# Patient Record
Sex: FEMALE | Race: WHITE | NOT HISPANIC OR LATINO | Employment: UNEMPLOYED | URBAN - METROPOLITAN AREA
[De-identification: names, ages, dates, MRNs, and addresses within clinical notes are randomized per-mention and may not be internally consistent; named-entity substitution may affect disease eponyms.]

---

## 2021-12-08 ENCOUNTER — HOSPITAL ENCOUNTER (EMERGENCY)
Facility: CLINIC | Age: 56
Discharge: ANOTHER HOSPITAL | End: 2021-12-09
Attending: EMERGENCY MEDICINE | Admitting: EMERGENCY MEDICINE
Payer: MEDICAID

## 2021-12-08 ENCOUNTER — TELEPHONE (OUTPATIENT)
Dept: BEHAVIORAL HEALTH | Facility: CLINIC | Age: 56
End: 2021-12-08
Payer: MEDICAID

## 2021-12-08 DIAGNOSIS — F23 ACUTE PSYCHOSIS (H): ICD-10-CM

## 2021-12-08 DIAGNOSIS — L03.032 CELLULITIS OF LEFT TOE: ICD-10-CM

## 2021-12-08 LAB
ALCOHOL BREATH TEST: 0 (ref 0–0.01)
AMPHETAMINES UR QL SCN: NORMAL
BARBITURATES UR QL: NORMAL
BENZODIAZ UR QL: NORMAL
CANNABINOIDS UR QL SCN: NORMAL
COCAINE UR QL: NORMAL
OPIATES UR QL SCN: NORMAL

## 2021-12-08 PROCEDURE — C9803 HOPD COVID-19 SPEC COLLECT: HCPCS | Performed by: EMERGENCY MEDICINE

## 2021-12-08 PROCEDURE — 90791 PSYCH DIAGNOSTIC EVALUATION: CPT

## 2021-12-08 PROCEDURE — 250N000013 HC RX MED GY IP 250 OP 250 PS 637: Performed by: EMERGENCY MEDICINE

## 2021-12-08 PROCEDURE — 82075 ASSAY OF BREATH ETHANOL: CPT | Performed by: EMERGENCY MEDICINE

## 2021-12-08 PROCEDURE — 87635 SARS-COV-2 COVID-19 AMP PRB: CPT | Performed by: EMERGENCY MEDICINE

## 2021-12-08 PROCEDURE — 99285 EMERGENCY DEPT VISIT HI MDM: CPT | Performed by: EMERGENCY MEDICINE

## 2021-12-08 PROCEDURE — 99285 EMERGENCY DEPT VISIT HI MDM: CPT | Mod: 25 | Performed by: EMERGENCY MEDICINE

## 2021-12-08 PROCEDURE — 80307 DRUG TEST PRSMV CHEM ANLYZR: CPT | Performed by: EMERGENCY MEDICINE

## 2021-12-08 RX ORDER — CEPHALEXIN 500 MG/1
500 CAPSULE ORAL EVERY 6 HOURS SCHEDULED
Status: COMPLETED | OUTPATIENT
Start: 2021-12-08 | End: 2021-12-08

## 2021-12-08 RX ADMIN — CEPHALEXIN 500 MG: 500 CAPSULE ORAL at 22:08

## 2021-12-08 ASSESSMENT — ENCOUNTER SYMPTOMS
ACTIVITY CHANGE: 1
CARDIOVASCULAR NEGATIVE: 1
RESPIRATORY NEGATIVE: 1
DECREASED CONCENTRATION: 1
SLEEP DISTURBANCE: 1
COLOR CHANGE: 1
APPETITE CHANGE: 1
DYSPHORIC MOOD: 1
WOUND: 1
CONFUSION: 1
NERVOUS/ANXIOUS: 1
GASTROINTESTINAL NEGATIVE: 1
AGITATION: 1

## 2021-12-09 ENCOUNTER — TELEPHONE (OUTPATIENT)
Dept: BEHAVIORAL HEALTH | Facility: CLINIC | Age: 56
End: 2021-12-09
Payer: MEDICAID

## 2021-12-09 ENCOUNTER — HOSPITAL ENCOUNTER (INPATIENT)
Facility: CLINIC | Age: 56
LOS: 20 days | Discharge: PSYCHIATRIC OR SUBSTANCE USE FACILITY | End: 2021-12-29
Attending: PSYCHIATRY & NEUROLOGY | Admitting: PSYCHIATRY & NEUROLOGY
Payer: MEDICAID

## 2021-12-09 VITALS
OXYGEN SATURATION: 97 % | RESPIRATION RATE: 16 BRPM | HEART RATE: 77 BPM | SYSTOLIC BLOOD PRESSURE: 131 MMHG | TEMPERATURE: 97.7 F | WEIGHT: 127.8 LBS | DIASTOLIC BLOOD PRESSURE: 92 MMHG

## 2021-12-09 PROBLEM — F29 PSYCHOSIS (H): Status: ACTIVE | Noted: 2021-12-09

## 2021-12-09 LAB — SARS-COV-2 RNA RESP QL NAA+PROBE: NEGATIVE

## 2021-12-09 PROCEDURE — 128N000001 HC R&B CD/MH ADULT

## 2021-12-09 PROCEDURE — 250N000013 HC RX MED GY IP 250 OP 250 PS 637: Performed by: FAMILY MEDICINE

## 2021-12-09 RX ORDER — OLANZAPINE 10 MG/2ML
10 INJECTION, POWDER, FOR SOLUTION INTRAMUSCULAR 3 TIMES DAILY PRN
Status: DISCONTINUED | OUTPATIENT
Start: 2021-12-09 | End: 2021-12-29 | Stop reason: HOSPADM

## 2021-12-09 RX ORDER — ACETAMINOPHEN 325 MG/1
650 TABLET ORAL EVERY 4 HOURS PRN
Status: DISCONTINUED | OUTPATIENT
Start: 2021-12-09 | End: 2021-12-29 | Stop reason: HOSPADM

## 2021-12-09 RX ORDER — CEPHALEXIN 500 MG/1
500 CAPSULE ORAL EVERY 6 HOURS SCHEDULED
Status: COMPLETED | OUTPATIENT
Start: 2021-12-09 | End: 2021-12-09

## 2021-12-09 RX ORDER — OLANZAPINE 10 MG/1
10 TABLET ORAL 3 TIMES DAILY PRN
Status: DISCONTINUED | OUTPATIENT
Start: 2021-12-09 | End: 2021-12-29 | Stop reason: HOSPADM

## 2021-12-09 RX ORDER — MULTIVITAMIN,THERAPEUTIC
1 TABLET ORAL DAILY
Status: ON HOLD | COMMUNITY
End: 2021-12-30

## 2021-12-09 RX ORDER — MAGNESIUM HYDROXIDE/ALUMINUM HYDROXICE/SIMETHICONE 120; 1200; 1200 MG/30ML; MG/30ML; MG/30ML
30 SUSPENSION ORAL EVERY 4 HOURS PRN
Status: DISCONTINUED | OUTPATIENT
Start: 2021-12-09 | End: 2021-12-29 | Stop reason: HOSPADM

## 2021-12-09 RX ORDER — TRAZODONE HYDROCHLORIDE 50 MG/1
50 TABLET, FILM COATED ORAL
Status: DISCONTINUED | OUTPATIENT
Start: 2021-12-09 | End: 2021-12-29 | Stop reason: HOSPADM

## 2021-12-09 RX ORDER — HYDROXYZINE HYDROCHLORIDE 25 MG/1
25 TABLET, FILM COATED ORAL EVERY 4 HOURS PRN
Status: DISCONTINUED | OUTPATIENT
Start: 2021-12-09 | End: 2021-12-29 | Stop reason: HOSPADM

## 2021-12-09 RX ADMIN — CEPHALEXIN 500 MG: 500 CAPSULE ORAL at 10:40

## 2021-12-09 RX ADMIN — CEPHALEXIN 500 MG: 500 CAPSULE ORAL at 15:55

## 2021-12-09 ASSESSMENT — ACTIVITIES OF DAILY LIVING (ADL)
DRESS: SCRUBS (BEHAVIORAL HEALTH);INDEPENDENT
HYGIENE/GROOMING: HANDWASHING;INDEPENDENT
ORAL_HYGIENE: INDEPENDENT

## 2021-12-09 ASSESSMENT — MIFFLIN-ST. JEOR: SCORE: 1163.78

## 2021-12-09 NOTE — ED NOTES
[facility] ED Mental Health Handoff Note:       Brief HPI:  Patient was signed out to me by previous physician see initial ED Provider note for full details of the presentation.   Home meds reviewed and ordered/administered: Yes    Medically stable for inpatient mental health admission: Yes.    Evaluated by mental health: Yes. The recommendation is for inpatient mental health treatment. Bed search in process    Safety concerns: At the time I received sign out, there were no safety concerns.    Emergency department course:  Patient was signed out to me by previous physician.  Currently awaiting transfer or admission for mental health.  Patient was sleeping comfortably overnight during my shift.  There were no issues during my shift.  Patient care will be signed out to the oncoming physician.       Shelton Collins,   12/09/21 0134

## 2021-12-09 NOTE — ED TRIAGE NOTES
"Patient is here with sister and brother In law.   Patients families has concerned of mental health; for a couple months. Patient reports that she is \" in reality\" Patient reports that she is engaged to Quique He  And he is [place] the [place].     Sister Is very triggering for patient and confrontational in triage   "
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
## 2021-12-09 NOTE — PHARMACY-ADMISSION MEDICATION HISTORY
Admission Medication History Completed by Pharmacy    See [facility] Admission Navigator for allergy information, preferred outpatient pharmacy, prior to admission medications and immunization status.     Medication History Sources:     Patient    Chart Review    Changes made to PTA medication list (reason):    Added: None    Deleted: None    Changed: None    Additional Information:    Patient reported taking citalopram in the past, but it has been over a year since she last took it.    Prior to Admission medications    Medication Sig Last Dose Taking? Auth Provider   multivitamin, therapeutic (THERA-VIT) TABS tablet Take 1 tablet by mouth daily More than a month at Unknown time Yes Unknown, Entered By History     Date completed: 12/09/21    Medication history completed by: Nakia Gamble, YeimiD, BCPS
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
## 2021-12-09 NOTE — ED PROVIDER NOTES
Patient seen by Dr. Solares with history of manic behavior and toe pain.  Patient on keflex for toe cellulitis.     Patient otherwise been cooperative stable here in the ER been managed per protocol for mental health until patient now disposition planning to be transferred as a direct admission to [facility].    Accepting physician noted Dr. Bocanegra.    /78   Pulse 91   Temp 99.6  F (37.6  C) (Oral)   Resp 18   Wt 58 kg (127 lb 12.8 oz)   SpO2 97%   Breastfeeding No        Al Perez MD  12/09/21 9169

## 2021-12-10 LAB
ALBUMIN SERPL-MCNC: 3.8 G/DL (ref 3.5–5)
ALBUMIN UR-MCNC: NEGATIVE MG/DL
ALP SERPL-CCNC: 75 U/L (ref 45–120)
ALT SERPL W P-5'-P-CCNC: 11 U/L (ref 0–45)
ANION GAP SERPL CALCULATED.3IONS-SCNC: 10 MMOL/L (ref 5–18)
APPEARANCE UR: CLEAR
AST SERPL W P-5'-P-CCNC: 16 U/L (ref 0–40)
ATRIAL RATE - MUSE: 69 BPM
BACTERIA #/AREA URNS HPF: ABNORMAL /HPF
BASOPHILS # BLD AUTO: 0.1 10E3/UL (ref 0–0.2)
BASOPHILS NFR BLD AUTO: 1 %
BILIRUB SERPL-MCNC: 1.6 MG/DL (ref 0–1)
BILIRUB UR QL STRIP: NEGATIVE
BUN SERPL-MCNC: 19 MG/DL (ref 8–22)
CALCIUM SERPL-MCNC: 9.3 MG/DL (ref 8.5–10.5)
CHLORIDE BLD-SCNC: 105 MMOL/L (ref 98–107)
CHOLEST SERPL-MCNC: 180 MG/DL
CO2 SERPL-SCNC: 26 MMOL/L (ref 22–31)
COLOR UR AUTO: ABNORMAL
CREAT SERPL-MCNC: 0.77 MG/DL (ref 0.6–1.1)
DIASTOLIC BLOOD PRESSURE - MUSE: NORMAL MMHG
EOSINOPHIL # BLD AUTO: 0.2 10E3/UL (ref 0–0.7)
EOSINOPHIL NFR BLD AUTO: 2 %
ERYTHROCYTE [DISTWIDTH] IN BLOOD BY AUTOMATED COUNT: 12.1 % (ref 10–15)
GFR SERPL CREATININE-BSD FRML MDRD: 87 ML/MIN/1.73M2
GLUCOSE BLD-MCNC: 103 MG/DL (ref 70–125)
GLUCOSE UR STRIP-MCNC: NEGATIVE MG/DL
HBA1C MFR BLD: 5.2 %
HCT VFR BLD AUTO: 44.6 % (ref 35–47)
HDLC SERPL-MCNC: 58 MG/DL
HGB BLD-MCNC: 14.7 G/DL (ref 11.7–15.7)
HGB UR QL STRIP: ABNORMAL
IMM GRANULOCYTES # BLD: 0 10E3/UL
IMM GRANULOCYTES NFR BLD: 0 %
INTERPRETATION ECG - MUSE: NORMAL
KETONES UR STRIP-MCNC: NEGATIVE MG/DL
LDLC SERPL CALC-MCNC: 95 MG/DL
LEUKOCYTE ESTERASE UR QL STRIP: ABNORMAL
LYMPHOCYTES # BLD AUTO: 1.5 10E3/UL (ref 0.8–5.3)
LYMPHOCYTES NFR BLD AUTO: 22 %
MCH RBC QN AUTO: 31.2 PG (ref 26.5–33)
MCHC RBC AUTO-ENTMCNC: 33 G/DL (ref 31.5–36.5)
MCV RBC AUTO: 95 FL (ref 78–100)
MONOCYTES # BLD AUTO: 0.4 10E3/UL (ref 0–1.3)
MONOCYTES NFR BLD AUTO: 6 %
MUCOUS THREADS #/AREA URNS LPF: PRESENT /LPF
NEUTROPHILS # BLD AUTO: 4.6 10E3/UL (ref 1.6–8.3)
NEUTROPHILS NFR BLD AUTO: 69 %
NITRATE UR QL: NEGATIVE
NRBC # BLD AUTO: 0 10E3/UL
NRBC BLD AUTO-RTO: 0 /100
P AXIS - MUSE: 55 DEGREES
PH UR STRIP: 5.5 [PH] (ref 5–7)
PLATELET # BLD AUTO: 347 10E3/UL (ref 150–450)
POTASSIUM BLD-SCNC: 4.4 MMOL/L (ref 3.5–5)
PR INTERVAL - MUSE: 156 MS
PROT SERPL-MCNC: 7 G/DL (ref 6–8)
QRS DURATION - MUSE: 82 MS
QT - MUSE: 390 MS
QTC - MUSE: 417 MS
R AXIS - MUSE: 61 DEGREES
RBC # BLD AUTO: 4.71 10E6/UL (ref 3.8–5.2)
RBC URINE: 7 /HPF
SODIUM SERPL-SCNC: 141 MMOL/L (ref 136–145)
SP GR UR STRIP: 1.02 (ref 1–1.03)
SQUAMOUS EPITHELIAL: 3 /HPF
SYSTOLIC BLOOD PRESSURE - MUSE: NORMAL MMHG
T AXIS - MUSE: 75 DEGREES
TRANSITIONAL EPI: 1 /HPF
TRIGL SERPL-MCNC: 137 MG/DL
TSH SERPL DL<=0.005 MIU/L-ACNC: 1.34 UIU/ML (ref 0.3–5)
UROBILINOGEN UR STRIP-MCNC: <2 MG/DL
VENTRICULAR RATE- MUSE: 69 BPM
WBC # BLD AUTO: 6.6 10E3/UL (ref 4–11)
WBC URINE: 62 /HPF

## 2021-12-10 PROCEDURE — 36415 COLL VENOUS BLD VENIPUNCTURE: CPT | Performed by: PSYCHIATRY & NEUROLOGY

## 2021-12-10 PROCEDURE — 80053 COMPREHEN METABOLIC PANEL: CPT | Performed by: PSYCHIATRY & NEUROLOGY

## 2021-12-10 PROCEDURE — 999N000054 HC STATISTIC EKG NON-CHARGEABLE

## 2021-12-10 PROCEDURE — 87086 URINE CULTURE/COLONY COUNT: CPT | Performed by: PSYCHIATRY & NEUROLOGY

## 2021-12-10 PROCEDURE — 85025 COMPLETE CBC W/AUTO DIFF WBC: CPT | Performed by: PSYCHIATRY & NEUROLOGY

## 2021-12-10 PROCEDURE — 99223 1ST HOSP IP/OBS HIGH 75: CPT | Performed by: PSYCHIATRY & NEUROLOGY

## 2021-12-10 PROCEDURE — 93010 ELECTROCARDIOGRAM REPORT: CPT | Performed by: INTERNAL MEDICINE

## 2021-12-10 PROCEDURE — 128N000001 HC R&B CD/MH ADULT

## 2021-12-10 PROCEDURE — 93005 ELECTROCARDIOGRAM TRACING: CPT

## 2021-12-10 PROCEDURE — 83036 HEMOGLOBIN GLYCOSYLATED A1C: CPT | Performed by: PSYCHIATRY & NEUROLOGY

## 2021-12-10 PROCEDURE — 250N000013 HC RX MED GY IP 250 OP 250 PS 637: Performed by: PSYCHIATRY & NEUROLOGY

## 2021-12-10 PROCEDURE — 80061 LIPID PANEL: CPT | Performed by: PSYCHIATRY & NEUROLOGY

## 2021-12-10 PROCEDURE — 999N000157 HC STATISTIC RCP TIME EA 10 MIN

## 2021-12-10 PROCEDURE — 84443 ASSAY THYROID STIM HORMONE: CPT | Performed by: PSYCHIATRY & NEUROLOGY

## 2021-12-10 PROCEDURE — 81001 URINALYSIS AUTO W/SCOPE: CPT | Performed by: PSYCHIATRY & NEUROLOGY

## 2021-12-10 RX ORDER — ZIPRASIDONE HYDROCHLORIDE 20 MG/1
20 CAPSULE ORAL 2 TIMES DAILY WITH MEALS
Status: DISCONTINUED | OUTPATIENT
Start: 2021-12-10 | End: 2021-12-13

## 2021-12-10 RX ADMIN — ZIPRASIDONE HCL 20 MG: 20 CAPSULE ORAL at 17:17

## 2021-12-10 RX ADMIN — ZIPRASIDONE HCL 20 MG: 20 CAPSULE ORAL at 12:30

## 2021-12-10 ASSESSMENT — ACTIVITIES OF DAILY LIVING (ADL)
ORAL_HYGIENE: INDEPENDENT
DRESS: SCRUBS (BEHAVIORAL HEALTH);STREET CLOTHES;INDEPENDENT
ORAL_HYGIENE: INDEPENDENT
DRESS: INDEPENDENT
HYGIENE/GROOMING: INDEPENDENT
HYGIENE/GROOMING: INDEPENDENT

## 2021-12-10 NOTE — PLAN OF CARE
"  Problem: Activity and Energy Impairment (Anxiety Signs/Symptoms)  Goal: Optimized Energy Level (Anxiety Signs/Symptoms)  12/10/2021 0940 by Ansley Olguin RN  Outcome: No Change  12/10/2021 0940 by Ansley Olguin RN  Outcome: No Change     Problem: Cognitive Impairment (Anxiety Signs/Symptoms)  Goal: Optimized Cognitive Function (Anxiety Signs/Symptoms)  Outcome: No Change   Pt was in and out of her room this shift. Pt said that her anxiety is depression are very high , because she doesn't know what is real and what is not. Pt said that she received emails from Ayde Barnett and was asked to go out of the shelter at night. Pt said \" May by is was deceived \" Speech was tangential and thoughts were disorganized and , delusional and tangential. Pt said that people in her [employer] were telling her what God wants her to do. Pt very tearful. She was alert and oriented times 3, not to situation. Pt thinks that her sister is not helping her , but hurting her. Pt attended groups.   "

## 2021-12-10 NOTE — PLAN OF CARE
Care plan initiated for patient who admitted on 12/9/2021. Occupational therapy will engage patient as appropriate, providing invitation to groups and encouraging active participation.  Formal assessment to be completed next week.

## 2021-12-10 NOTE — PHARMACY-ADMISSION MEDICATION HISTORY
Admission medication history completed at [facility] Emergency Department. Please see Pharmacy - Admission Medication History note from 12/09/2021.

## 2021-12-10 NOTE — PROGRESS NOTES
12/10/21 1418   Engagement   Intervention Group   Topic Detail OT Creative Expressions group-[employer] for creativity, symptom management, focus, following directions, social engagement and healthy distraction   Attendance Attended   Patient Response Demonstrated understanding of materials provided;Expressed feelings/issues;Was respectful;Asked questions and/or took notes;Accepted feedback   Concentrated on Task duration of group   Cognition Goal-directed;Distractible;Sequences task;Attends to detail   Mood/Affect Anxious;Congruent;Pleasant   Social/Behavioral Cooperative;Engaged;Redirectable   Goals addressed in session today pt actively engaged in group activity. pt chose a bell design for her project. pt mixed her own colors to get her desired effect. pt engaged in conversation with staff and peers during activity.
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
## 2021-12-10 NOTE — PROGRESS NOTES
Patient denies pain. She was observed sleeping for most part of the night. Safety checks is ongoing per unit protocol. Pt is still sleeping.

## 2021-12-10 NOTE — PLAN OF CARE
Occupational Therapy   AIDET      Patient was introduced to the role of occupational therapy and oriented to the process of occupational therapy services on the unit, including function of groups. Patient was given the Occupational Therapy Questionnaire to complete. Patient in bed reading on approach. Pt agreeable to complete form and meet with OT at a later date. OT will follow up with assessment and address any questions, needs, or concerns.    12/10/2021
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
## 2021-12-10 NOTE — PROGRESS NOTES
CLINICAL NUTRITION SERVICES - ASSESSMENT NOTE     Nutrition Prescription    RECOMMENDATIONS FOR MDs/PROVIDERS TO ORDER:    Malnutrition :  % Intake: Unable to assess  % Weight Loss: Unable to assess  Subcutaneous Fat Loss: Unable to assess- off site  Muscle Loss: Unable to assess  Fluid Accumulation/Edema: None noted  Malnutrition Diagnosis: Unable to determine       Malnutrition Diagnosis: Unable to determine       Recommendations already ordered by Registered Dietitian (RD):  Continue current nutrition Rx    Future/Additional Recommendations:  NFPE, diet hx when pt available     REASON FOR ASSESSMENT  Beatris Cuellar is a/an 56 year old female assessed by the dietitian for Admission Nutrition Risk Screen for positive for weight loss and poor intake  History of psychosis, schizoaffective disorder, bipolar type  Admitted for paranoia, hallucinations, inability to care for self    NUTRITION HISTORY      Food allergies/intolerances: NKFA     Cultural needs or preferences none noted    Patient is on a Regular diet     Typical food/fluid intake:decreased PTA per risk screen    Supplements at home:none    Living situation:homeless living with family members 'recently evicted from the shelter'    CURRENT NUTRITION ORDERS  Diet: Regular    Intake/Tolerance:     Per flow sheet review, 50 x 1-100% x 2 intake for documented meals noted so far    LABS: reviewed including:  Potassium (mmol/L)   Date Value   12/10/2021 4.4    Blood Glucose  Glucose (mg/dL)   Date Value   12/10/2021 103    Inflammatory Markers  WBC Count (10e3/uL)   Date Value   12/10/2021 6.6     Albumin (g/dL)   Date Value   12/10/2021 3.8     Sodium (mmol/L)   Date Value   12/10/2021 141    Renal  Urea Nitrogen (mg/dL)   Date Value   12/10/2021 19     Creatinine (mg/dL)   Date Value   12/10/2021 0.77     Additional  Triglycerides (mg/dL)   Date Value   12/10/2021 137       MEDICATIONS    Medications reviewed    ziprasidone  20 mg Oral BID w/meals          "        ANTHROPOMETRICS  Height: 5' 5\"  Weight: 56 kg   Body mass index is 20.83 kg/m .  Weight Status:  Normal BMI    Weight History:   Wt Readings from Last 10 Encounters:   12/09/21 56.8 kg (125 lb 3.2 oz)   12/08/21 58 kg (127 lb 12.8 oz)     Unable to assess weight changes in the last year d/t limited data    Dosing Weight: 56 kg current     ASSESSED NUTRITION NEEDS  Estimated Energy Needs: 0812-9141 kcals/day (25 - 30 kcals/kg)  Justification: Maintenance  Estimated Protein Needs: 55-65 grams protein/day (1 - 1.2 grams of pro/kg)  Justification: Maintenance  Estimated Fluid Needs:  (1 mL/kcal)   Justification: Maintenance    PHYSICAL FINDINGS  See malnutrition section above.     Brian score :Nutrition 4 Total Score 22    GI Status/Output:  Last BM:WD:  per nursing   No issues noted  No intake/output data recorded.    NUTRITION DIAGNOSIS  No nutrition diagnosis at this time       INTERVENTIONS  Implementation   Conitnue Current Nutrition Rx    Goals  Maintain Weight  Intake > 75% of meals    Monitoring/Evaluation  Progress toward goals will be monitored and evaluated per protocol.   "

## 2021-12-10 NOTE — PROGRESS NOTES
12/10/21 1051   Engagement   Intervention Group   Topic Detail OT Wellness group-Movement Bingo for physical movement, following directions, wellness strategies, healthy distraction and symptom management   Attendance Attended   Patient Response Demonstrated understanding of materials provided;Expressed feelings/issues;Improved mood in response to group;Was respectful;Asked questions and/or took notes   Concentrated on Task duration of group   Cognition Goal-directed   Mood/Affect Anxious;Tearful;Flat   Social/Behavioral Cooperative;Engaged   Goals addressed in session today pt goes by Katja. pt actively engaged in group activity. pt shared she enjoys walking as a form of exercise. pt endorsed positive response to physical movement. pt shared she felt less anxious. pt was provided a clean copy of the bingo card for her personal use.

## 2021-12-10 NOTE — PROGRESS NOTES
57 yo female patient admitted here at 1630 from [facility] where she was seen for evaluation of delusion. Here, patient reports that her sister is the reason why she is here because she thinks she is delusional. She believes that she has been working with 2 URBANARA that reside in [facility], Quique Barnett and Wan Jeanine. She is disappointed that she is here and that her people (meaning rodney, the 2 URBANARA and LCO Creation Jeanine) have not come through for her. Presents as labile, she was emotional/crying/ and pleasant. Cooperative with assessment, no scheduled medications given. She denied any pain or discomfort, independent with ADLs. Alert and oriented x3. Denied anxiety, depression, no SI/HI, contracted for safety. Swollen with small open area on 2nd 3rd left toe.

## 2021-12-11 PROCEDURE — 128N000001 HC R&B CD/MH ADULT

## 2021-12-11 PROCEDURE — 250N000013 HC RX MED GY IP 250 OP 250 PS 637: Performed by: PSYCHIATRY & NEUROLOGY

## 2021-12-11 RX ADMIN — ZIPRASIDONE HCL 20 MG: 20 CAPSULE ORAL at 17:11

## 2021-12-11 RX ADMIN — ZIPRASIDONE HCL 20 MG: 20 CAPSULE ORAL at 07:57

## 2021-12-11 ASSESSMENT — ACTIVITIES OF DAILY LIVING (ADL)
HYGIENE/GROOMING: INDEPENDENT
ORAL_HYGIENE: INDEPENDENT
DRESS: SCRUBS (BEHAVIORAL HEALTH)

## 2021-12-11 ASSESSMENT — MIFFLIN-ST. JEOR: SCORE: 1163.73

## 2021-12-11 NOTE — PLAN OF CARE
Problem: Mood Impairment (Depressive Signs/Symptoms)  Goal: Improved Mood Symptoms (Depressive Signs/Symptoms)  Outcome: Improving     Problem: Behavioral Health Plan of Care  Goal: Adheres to Safety Considerations for Self and Others  Outcome: Improving  Intervention: Develop and Maintain Individualized Safety Plan  Recent Flowsheet Documentation  Taken 12/11/2021 0900 by Florencia Norris RN  Safety Measures: environmental rounds completed   pt. Out in the milieu, relaxing in the recliner isolative to self. Pt was agitated and upset during a mid-morning phone call, calm and quiet reminder of the shift, Pleasant, cooperative and compliant with medications. Denies pain anxiety, depression, SI/HI, and hallucinations, contracted for safety.

## 2021-12-11 NOTE — PLAN OF CARE
Problem: Sleep Disturbance (Anxiety Signs/Symptoms)  Goal: Improved Sleep (Anxiety Signs/Symptoms)  Outcome: Improving   Pt slept most of the shift, pt denied pain, anxiety,safety checks done every 15 minutes.

## 2021-12-11 NOTE — PLAN OF CARE
BEHAVIORAL TEAM DISCUSSION    Participants:     -Dr. Daljit BRITTON  -Scott ZAPATA Charge RN  -Lenora FERNANDEZ PharmD    Progress: No changes, remains delusional  Anticipated length of stay: TBD  Continued Stay Criteria/Rationale: symptom stabilization, lacks insurance medication management care coordination  Medical/Physical: no records on file  Precautions:   Behavioral Orders   Procedures     Cheeking Precautions (behavioral units)     Code 1 - Restrict to Unit     Routine Programming     As clinically indicated     Status 15     Every 15 minutes.     Suicide precautions     Patients on Suicide Precautions should have a Combination Diet ordered that includes a Diet selection(s) AND a Behavioral Tray selection for Safe Tray - with utensils, or Safe Tray - NO utensils       Plan:  Coordinate insurance IRTS, psychiatry established in [place] with long term plans to return to [place]  Rationale for change in precautions or plan: No changes

## 2021-12-11 NOTE — PROGRESS NOTES
Past Medical History:   Diagnosis Date    Anticoagulant long-term use     but has been noncompliant    Arthritis     Atrial fibrillation, chronic     Cardiomyopathy, nonischemic 11/9/2014    Coronary artery disease     Encounter for blood transfusion     Gout     Hypertension     Systolic heart failure        Past Surgical History:   Procedure Laterality Date    ABLATION N/A 8/15/2018    Procedure: ABLATION;  Surgeon: Mario Lou MD;  Location: [facility] CATH LAB;  Service: Cardiology;  Laterality: N/A;  AF, PEGGY, PVI, RFA, RUDDY, Gen, MB, 3 Prep    ANKLE SURGERY      CARDIAC CATHETERIZATION      CATARACT EXTRACTION W/  INTRAOCULAR LENS IMPLANT Bilateral        Review of patient's allergies indicates:  No Known Allergies    No current facility-administered medications on file prior to encounter.      Current Outpatient Medications on File Prior to Encounter   Medication Sig    allopurinol (ZYLOPRIM) 100 MG tablet Take 1 tablet (100 mg total) by mouth once daily.    allopurinol (ZYLOPRIM) 300 MG tablet Take 300 mg by mouth once daily.     amiodarone (PACERONE) 200 MG Tab Take 1 tablet (200 mg total) by mouth once daily.    carvedilol (COREG) 6.25 MG tablet Take 1 tablet (6.25 mg total) by mouth 2 (two) times daily with meals.    digoxin (LANOXIN) 250 mcg tablet Take 1 tablet (250 mcg total) by mouth once daily.    isosorbide mononitrate (IMDUR) 60 MG 24 hr tablet Take 1 tablet (60 mg total) by mouth once daily.    lisinopril (PRINIVIL,ZESTRIL) 40 MG tablet Take 1 tablet (40 mg total) by mouth once daily.    mupirocin (BACTROBAN) 2 % ointment Apply topically 3 (three) times daily.    pantoprazole (PROTONIX) 40 MG tablet Take 1 tablet (40 mg total) by mouth once daily.    warfarin (COUMADIN) 2 MG tablet Take 1 tablet on Tuesdays and 1/2 tablet on all other day by mouth every evening as directed by coumadin clinic. (Patient taking differently: Take 1 tablet every Tuesday and Thursday and 1/2  Pt was doubtful about her ability to do the project initially, but once she was set up with materials and given a demonstration she was able to work independently.  Pt was very focused on details and invested in the outcome of her work.  Pt was social with staff and talked about her sister who was a nurse here previously.  Pt shared about her professional life and was on the edge of tears when she was talking about cyber issues she was having in her home.  Unclear if this is reality based to this writer.        12/11/21 1321   Engagement   Intervention Group   Topic Detail Yarn hat ornaments for creative expression, attention/concentration, fine motor skills, direction following, simple problem solving, follow through, coping, relaxation, stress reduction, symptom mangement, healthy leisure and socialization   Attendance Attended   Patient Response Demonstrated understanding of materials provided;Accepted feedback;Expressed feelings/issues;Was respectful   Concentrated on Task duration of group   Cognition Follows through with task;Goal-directed   Mood/Affect Pleasant   Social/Behavioral Engaged      tablet on all other day by mouth every evening as directed by coumadin clinic.)    furosemide (LASIX) 40 MG tablet Take 1 tablet (40 mg total) by mouth 2 (two) times daily.     Family History     Problem Relation (Age of Onset)    Hypertension Mother, Father    Stroke Mother        Tobacco Use    Smoking status: Former Smoker     Last attempt to quit: 1994     Years since quittin.8    Smokeless tobacco: Never Used   Substance and Sexual Activity    Alcohol use: Yes     Alcohol/week: 3.0 standard drinks     Types: 3 Cans of beer per week     Frequency: 2-3 times a week     Drinks per session: 3 or 4    Drug use: No    Sexual activity: Not on file     Review of Systems   Constitutional: Negative for appetite change, chills, diaphoresis, fatigue and fever.   HENT: Negative for congestion, ear pain, mouth sores, sore throat and trouble swallowing.    Eyes: Negative for pain and visual disturbance.   Respiratory: Positive for shortness of breath. Negative for cough and chest tightness.    Cardiovascular: Positive for palpitations. Negative for chest pain and leg swelling.   Gastrointestinal: Positive for abdominal distention. Negative for abdominal pain, constipation, diarrhea, nausea and vomiting.   Endocrine: Negative for cold intolerance, heat intolerance, polydipsia and polyuria.   Genitourinary: Negative for dysuria, frequency and hematuria.   Musculoskeletal: Negative for arthralgias, back pain, myalgias and neck pain.   Skin: Negative for pallor, rash and wound.   Allergic/Immunologic: Negative for environmental allergies and immunocompromised state.   Neurological: Negative for dizziness, seizures, syncope, weakness, numbness and headaches.   Hematological: Negative for adenopathy. Does not bruise/bleed easily.   Psychiatric/Behavioral: Negative for agitation, confusion and sleep disturbance.     Objective:     Vital Signs (Most Recent):  Temp: 97.7 °F (36.5 °C) (19 0621)  Pulse: 92 (19  0917)  Resp: 20 (11/13/19 0917)  BP: (!) 134/94 (11/13/19 0917)  SpO2: 96 % (11/13/19 0917) Vital Signs (24h Range):  Temp:  [97.7 °F (36.5 °C)] 97.7 °F (36.5 °C)  Pulse:  [] 92  Resp:  [18-20] 20  SpO2:  [95 %-99 %] 96 %  BP: (125-165)/() 134/94     Weight: 81.1 kg (178 lb 12.8 oz)  Body mass index is 28.86 kg/m².    Physical Exam   Constitutional: He is oriented to person, place, and time. He appears well-developed and well-nourished.   HENT:   Head: Normocephalic and atraumatic.   Eyes: Conjunctivae are normal.   Neck: Neck supple. No JVD present.   Cardiovascular: Normal heart sounds. An irregularly irregular rhythm present. Tachycardia present.   Pulmonary/Chest: Effort normal and breath sounds normal. He has no wheezes.   Abdominal: Soft. Bowel sounds are normal. He exhibits distension (mild). There is no tenderness.   Musculoskeletal: Normal range of motion. He exhibits deformity (bilateral hands due to gout).   Neurological: He is alert and oriented to person, place, and time.   Skin: Skin is warm and dry. No rash noted.   Psychiatric: He has a normal mood and affect. His behavior is normal. Thought content normal.   Nursing note and vitals reviewed.          Significant Labs:   CBC:   Recent Labs   Lab 11/13/19  0653   WBC 7.17   HGB 13.7*   HCT 43.3        CMP:   Recent Labs   Lab 11/13/19  0653      K 4.2      CO2 18*   *   BUN 16   CREATININE 1.1   CALCIUM 9.5   PROT 8.0   ALBUMIN 4.3   BILITOT 1.7*   ALKPHOS 89   AST 44*   ALT 37   ANIONGAP 19*   EGFRNONAA >60     Cardiac Markers:   Recent Labs   Lab 11/13/19  0653   *       Significant Imaging: I have reviewed all pertinent imaging results/findings within the past 24 hours.   Imaging Results          X-Ray Chest AP Portable (Final result)  Result time 11/13/19 07:24:13    Final result by Andrez Rodríguez MD (11/13/19 07:24:13)                 Impression:      Cardiomegaly.      Electronically signed  by: Andrez Rodríguez MD  Date:    11/13/2019  Time:    07:24             Narrative:    EXAMINATION:  XR CHEST AP PORTABLE    CLINICAL HISTORY:  sob;    FINDINGS:  Single view of the chest.   Aorta demonstrates atherosclerotic disease.    Cardiac silhouette is enlarged but stable.  The lungs demonstrate no evidence of active disease.  No evidence of pleural effusion or pneumothorax.  Bones appear intact.

## 2021-12-11 NOTE — PLAN OF CARE
Alert and oriented x3, able to communicate needs. Pleasant, cooperative and compliant wit medications. Visible in milieu, social and interactive with peers and staff members. Optimistic, appeared bright and denied any delusional thinking. Denied anxiety or depression, no SI/HI, contracted for safety. Denied pain or discomfort.  Problem: Cognitive Impairment (Anxiety Signs/Symptoms)  Goal: Optimized Cognitive Function (Anxiety Signs/Symptoms)  Outcome: Improving  Flowsheets (Taken 12/10/2021 1853)  Mutually Determined Action Steps (Optimized Cognitive Function): contributes to treatment plan     Problem: Mood Impairment (Anxiety Signs/Symptoms)  Goal: Improved Mood Symptoms (Anxiety Signs/Symptoms)  Outcome: Improving  Flowsheets (Taken 12/10/2021 1853)  Mutually Determined Action Steps (Improved Mood Symptoms):    adheres to medication regimen    shares insight re: need for meds     Problem: Somatic Disturbance (Anxiety Signs/Symptoms)  Goal: Improved Somatic Symptoms (Anxiety Signs/Symptoms)  Outcome: Improving  Flowsheets (Taken 12/10/2021 1853)  Mutually Determined Action Steps (Improved Somatic Symptoms): rates anxiety level via scale     Problem: Behavioral Health Plan of Care  Goal: Adheres to Safety Considerations for Self and Others  Outcome: Improving  Intervention: Develop and Maintain Individualized Safety Plan  Recent Flowsheet Documentation  Taken 12/10/2021 1600 by Dallas Snyder, RN  Safety Measures:    environmental rounds completed    safety rounds completed  Goal: Absence of New-Onset Illness or Injury  Outcome: Improving  Intervention: Identify and Manage Fall Risk  Recent Flowsheet Documentation  Taken 12/10/2021 1600 by Dallas Snyder, RN  Safety Measures:    environmental rounds completed    safety rounds completed  Goal: Optimized Coping Skills in Response to Life Stressors  Outcome: Improving     Problem: Suicidal Behavior  Goal: Suicidal Behavior is Absent or Managed  Outcome:  Improving  Flowsheets (Taken 12/10/2021 1853)  Mutually Determined Action Steps (Suicidal Behavior Absent/Managed): verbalizes safety check rationale     Problem: Behavioral Health Plan of Care  Goal: Plan of Care Review  Recent Flowsheet Documentation  Taken 12/10/2021 1600 by Dallas Snyder, RN  Plan of Care Reviewed With: patient  Patient Agreement with Plan of Care: agrees

## 2021-12-12 LAB — BACTERIA UR CULT: NO GROWTH

## 2021-12-12 PROCEDURE — 128N000001 HC R&B CD/MH ADULT

## 2021-12-12 PROCEDURE — 250N000013 HC RX MED GY IP 250 OP 250 PS 637: Performed by: PSYCHIATRY & NEUROLOGY

## 2021-12-12 RX ADMIN — ZIPRASIDONE HCL 20 MG: 20 CAPSULE ORAL at 07:51

## 2021-12-12 RX ADMIN — ZIPRASIDONE HCL 20 MG: 20 CAPSULE ORAL at 17:02

## 2021-12-12 ASSESSMENT — ACTIVITIES OF DAILY LIVING (ADL)
HYGIENE/GROOMING: INDEPENDENT
DRESS: SCRUBS (BEHAVIORAL HEALTH)
ORAL_HYGIENE: INDEPENDENT
HYGIENE/GROOMING: INDEPENDENT
DRESS: INDEPENDENT
ORAL_HYGIENE: INDEPENDENT

## 2021-12-12 NOTE — PLAN OF CARE
Problem: Activity and Energy Impairment (Anxiety Signs/Symptoms)  Goal: Optimized Energy Level (Anxiety Signs/Symptoms)  Outcome: Improving     Problem: Cognitive Impairment (Anxiety Signs/Symptoms)  Goal: Optimized Cognitive Function (Anxiety Signs/Symptoms)  Outcome: Improving     Problem: Mood Impairment (Anxiety Signs/Symptoms)  Goal: Improved Mood Symptoms (Anxiety Signs/Symptoms)  Outcome: Improving     Problem: Decreased Participation and Engagement (Depressive Signs/Symptoms)  Goal: Increased Participation and Engagement (Depressive Signs/Symptoms)  Outcome: Improving     Pt was pleasant on approach with blunted affect. Pt was visible in the milieu, relaxing in the recliner, but mostly isolative to self. Pt was calm, mostly quiet and cooperative during this shift. Compliant with medications. Denied pain, depression, SI/HI, and hallucinations.  Endorsed anxiety 3/10 but denied any need for a pharmacological intervention. Contracted for safety.

## 2021-12-12 NOTE — PLAN OF CARE
Problem: Sleep Disturbance (Depressive Signs/Symptoms)  Goal: Improved Sleep (Depressive Signs/Symptoms)  Outcome: Improving     Problem: Suicidal Behavior  Goal: Suicidal Behavior is Absent or Managed  Outcome: Improving    Patient slept most of the night and about 6 plus hours. Safety checks conducted every 15 minutes and no behaviors or psychosis noted. Denied SI, HI, SIB and pain @ AM and contracted for safety.

## 2021-12-12 NOTE — PLAN OF CARE
Problem: Cognitive Impairment (Anxiety Signs/Symptoms)  Goal: Optimized Cognitive Function (Anxiety Signs/Symptoms)  Outcome: Improving     Problem: Activity and Energy Impairment (Depressive Signs/Symptoms)  Goal: Optimized Energy Level (Depressive Signs/Symptoms)  Outcome: Improving     Problem: Behavioral Health Plan of Care  Goal: Plan of Care Review  Recent Flowsheet Documentation  Taken 12/12/2021 0840 by Florencia Norris RN  Plan of Care Reviewed With: patient  Patient Agreement with Plan of Care: agrees   Pt. Out in the milieu all shift, engaged appropriately with peers and staff, had a afternoon visitor, appears happy. Pt. States she is bored and their nothing to do. C/O of her lips feeling numb and having a dry mouth, mouth moisturizer effective.  Endorsed anxiety 1, depression 0 denies pain SI/HI and hallucinations Pleasant on approach and compliant with medications. Contracted for safety.

## 2021-12-13 ENCOUNTER — APPOINTMENT (OUTPATIENT)
Dept: CT IMAGING | Facility: CLINIC | Age: 56
End: 2021-12-13
Attending: PSYCHIATRY & NEUROLOGY
Payer: MEDICAID

## 2021-12-13 PROCEDURE — 99232 SBSQ HOSP IP/OBS MODERATE 35: CPT | Performed by: PSYCHIATRY & NEUROLOGY

## 2021-12-13 PROCEDURE — 128N000001 HC R&B CD/MH ADULT

## 2021-12-13 PROCEDURE — 250N000013 HC RX MED GY IP 250 OP 250 PS 637: Performed by: PSYCHIATRY & NEUROLOGY

## 2021-12-13 PROCEDURE — 90791 PSYCH DIAGNOSTIC EVALUATION: CPT | Performed by: PSYCHOLOGIST

## 2021-12-13 PROCEDURE — 70450 CT HEAD/BRAIN W/O DYE: CPT

## 2021-12-13 RX ORDER — ZIPRASIDONE HYDROCHLORIDE 40 MG/1
40 CAPSULE ORAL 2 TIMES DAILY WITH MEALS
Status: DISCONTINUED | OUTPATIENT
Start: 2021-12-13 | End: 2021-12-29 | Stop reason: HOSPADM

## 2021-12-13 RX ADMIN — ZIPRASIDONE HCL 20 MG: 20 CAPSULE ORAL at 07:52

## 2021-12-13 RX ADMIN — ZIPRASIDONE HCL 40 MG: 40 CAPSULE ORAL at 16:58

## 2021-12-13 ASSESSMENT — ACTIVITIES OF DAILY LIVING (ADL)
HYGIENE/GROOMING: INDEPENDENT
ORAL_HYGIENE: INDEPENDENT
DRESS: INDEPENDENT;SCRUBS (BEHAVIORAL HEALTH)

## 2021-12-13 NOTE — PLAN OF CARE
BEHAVIORAL TEAM DISCUSSION    Participants:     -Dr. Daljit BRITTON  -Rigoberto Lo RN  -Lenora FERNANDEZ PharmD    Progress: No changes, remains delusional  Anticipated length of stay: TBD  Continued Stay Criteria/Rationale: symptom stabilization, lacks insurance medication management care coordination  Medical/Physical: no records on file  Precautions:   Behavioral Orders   Procedures    Cheeking Precautions (behavioral units)    Code 1 - Restrict to Unit    Routine Programming     As clinically indicated    Status 15     Every 15 minutes.    Suicide precautions     Patients on Suicide Precautions should have a Combination Diet ordered that includes a Diet selection(s) AND a Behavioral Tray selection for Safe Tray - with utensils, or Safe Tray - NO utensils       Plan:  Coordinate insurance IRTS, psychiatry established in [place] with long term plans to return to [place]  Rationale for change in precautions or plan: No changes

## 2021-12-13 NOTE — PROGRESS NOTES
12/13/21 1500   Engagement   Intervention Group   Topic Detail OT: Creative Expression (Sand Art) to promote concentration, direction following, planning/problem-solving, self-esteem, and healthy liesure.    Attendance Attended   Patient Response Demonstrated understanding of materials provided;Was respectful;Positive attitude;Prosocial behavior   Concentrated on Task 30 - 45 min   Cognition Goal-directed;Attends to detail;Sequences task   Mood/Affect Content;Pleasant   Social/Behavioral Cooperative;Engaged   Thought Content Reality oriented   Goals addressed in session today Pt participated actively in group. Left briefly for CT scan but returned once back on unit. Pleasant, social and supportive of peers.

## 2021-12-13 NOTE — PLAN OF CARE
"   12/13/21 1600   Occupational Therapy Psychosocial Assessment   Assessment Type Interview;Interview Form;Chart Review;Interdisciplinary Team Report   Prior Level of Function   Current Living Arrangements homeless   Transportation Anticipated family or friend will provide  (pt mentioned that her sister is her ride )   Therapy Assessment   Patient Presentation Per H&P: \"This is a 56 year old female with history of Psychosis (H).  Current legal status is voluntary, but patient is holdable. Patient is a 57 y/o  woman,  with 2 children, homeless staying with family members, recently \"evicted\" from the shelter, with no source of income; that was admitted to the psychiatric inpatient unit due to increased psychosis, paranoia, hallucinations and inability to safely take care of herself.\" Pt willing to engage with writer. She is able to participate and answer questions however thought process is delusional and tangential at times. She does become tearful and also angry at one point during conversation but maintains appropriate interactions (polite, redirectable). Pt shares that she is homeless and has been kicked out of the shelter where she was staying. 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 Does become tearful and emotional about her situation. Pt perseverates on the fact that she does not have a mental illness. She reports that \"high profile people like me because I am a Godly woman.\" She is adamant that she was not a part of a cyber crime and that the individual is just misunderstood. She is able to identify many coping skills that she utilizes to manage stress.    Patient-identified areas of success Time spent with family or friends;Having a satisfying routine;Concentrating on own tasks;Taking care of the place of living;Transportation;Learning new things;Other (see " "comments)  (\"when I have direction\")   Healthy Leisure Activities \"I enjoy cleaning Karissa's home, cooking, painting, sewing, knitting, and crafts. I enjoy walking and singing\"   Patient-identified areas of difficulty Difficulty concentrating;Finances;Lack of satisfying daily routine;Living environment;Transportation   Patient-identified sources of support Family, friends or caregivers to help when needed;A best frient or significant other;Leisure supplies to support own interests and hobbies   Patient-identified emotional, physical or mental health concerns \"I am concerned that the verdict is still out as to whether I am a victim of a crime or not. If I am then I will continue to embrace all of the help my providers and supporting help offer me. If I am not then I expect to be discharged and my sister would pick me up.\"   Patient-identified coping stategies for these concerns \"Having a routine, schedule of activities or appointments.\"   Patient-identified stressors or changes in the past year \"my life - in the hospital instead of my own apartment.\"   Patient-identified values \"transparency, direction\"    Patient's goal for the future \"To be employed with a company who can appreciate my work ethic and my voice to empower their workforce.\"   Patient's goals to work on with OT Have hope for the future;Relax and enjoy oneself;Develop a satisfying daily routine   Clinical Impression   Patient Personal Strengths expressive of needs;family/social support;interests/hobbies;resourceful;coping skills   Pt appears to have the following barriers/limitations Limited insight into mental health symptoms;Unstable living arrangements;Poorly managed mental health symptoms   Patient's barriers/limitations contribute to Exacerbation of mental health symptoms   Planned Interventions Encourage group attendance;Engage in activities for insight development;Encourage engagement in meaningful activities;Improve self-management skills;Improve " work and leisure process skills   Minutes Spent with Patient 10   Beh OT Plan for Next Session Pt will practice using 4 coping strategies to manage stress and reduce symptoms this review period.

## 2021-12-13 NOTE — PLAN OF CARE
Problem: Sleep Disturbance  Goal: Adequate Sleep/Rest  Outcome: Improving   On  Q 15 minutes safety rounds, patient was observed sleeping. Eyes closed, respiratory rate normal. No complaints or concerns from patient at this time, no behaviors noted. Pt slept > 7 hours thus far, pt continues on suicide, cheeking precautions, will continue to monitor.

## 2021-12-13 NOTE — PLAN OF CARE
Problem: Activity and Energy Impairment (Anxiety Signs/Symptoms)  Goal: Optimized Energy Level (Anxiety Signs/Symptoms)  Outcome: Improving     Problem: Cognitive Impairment (Anxiety Signs/Symptoms)  Goal: Optimized Cognitive Function (Anxiety Signs/Symptoms)  Outcome: Improving     Problem: Sleep Disturbance (Anxiety Signs/Symptoms)  Goal: Improved Sleep (Anxiety Signs/Symptoms)  Outcome: Improving     Problem: Feelings of Worthlessness, Hopelessness or Excessive Guilt (Depressive Signs/Symptoms)  Goal: Enhanced Self-Esteem and Confidence (Depressive Signs/Symptoms)  Outcome: Improving     Problem: Nutrition Imbalance (Depressive Signs/Symptoms)  Goal: Optimized Nutrition Intake (Depressive Signs/Symptoms)  Outcome: Improving   Pt denies all psych symptoms and contracts for safety. Up and visible on the unit all shift engaging appropriately with peers and staff. Pt is pleasant with a bright affect. She is calm, cooperative, and medication compliant.  Will continue with same plan of care.

## 2021-12-13 NOTE — PROGRESS NOTES
CLINICAL NUTRITION SERVICES - REASSESSMENT NOTE      Recommendations Ordered by Registered Dietitian (RD):   Continue regular diet   Malnutrition:   % Weight Loss:  None noted  % Intake:  No decreased intake noted  Subcutaneous Fat Loss: Unable to assess - pt. unavailable  Muscle Loss: Unable to assess  Fluid Accumulation/Edema: None noted    Malnutrition Diagnosis: Patient does not meet two of the above criteria necessary for diagnosing malnutrition  In Context of:  Acute illness or injury     EVALUATION OF PROGRESS TOWARD GOALS   Diet:  Orders Placed This Encounter      Regular Diet Adult    Intake/Tolerance: 100% meal intakes    ASSESSED NUTRITION NEEDS:  Dosing Weight:  56 kg  Estimated Energy Needs: 8783-9639 kcals/day (25 - 30 kcals/kg)  Justification: Maintenance  Estimated Protein Needs: 55-65 grams protein/day (1 - 1.2 grams of pro/kg)  Justification: Maintenance  Estimated Fluid Needs:  (1 mL/kcal)   Justification: Maintenance    NEW FINDINGS:   - pt. Eating well, gaining weight  - pt. Unavailable during consult, in group. Information obtained from RN    12/14/21 0807 57.9 kg (127 lb 9 oz)   12/11/21 0741 56.8 kg (125 lb 3 oz)   12/09/21 1640 56.8 kg (125 lb 3.2 oz)     Previous Goals:   Maintain Weight  Intake > 75% of meals  Evaluation: Met    CURRENT NUTRITION DIAGNOSIS  No nutrition diagnosis identified at this time    INTERVENTIONS  Recommendations / Nutrition Prescription  Continue regular diet    Implementation  Collaboration and Referral of Nutrition care - discussed with pt's nurse    Goals  Meal intakes >75% TID  Maintain weight    MONITORING AND EVALUATION:  Progress towards goals will be monitored and evaluated per protocol and Practice Guidelines    Johanna Castellanos RDN, LD  Clinical Dietitian
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
## 2021-12-13 NOTE — PLAN OF CARE
Problem: Activity and Energy Impairment (Anxiety Signs/Symptoms)  Goal: Optimized Energy Level (Anxiety Signs/Symptoms)  Outcome: Improving     Problem: Cognitive Impairment (Anxiety Signs/Symptoms)  Goal: Optimized Cognitive Function (Anxiety Signs/Symptoms)  Outcome: Improving     Problem: Mood Impairment (Anxiety Signs/Symptoms)  Goal: Improved Mood Symptoms (Anxiety Signs/Symptoms)  Outcome: Improving     Problem: Sleep Disturbance (Anxiety Signs/Symptoms)  Goal: Improved Sleep (Anxiety Signs/Symptoms)  Outcome: Improving     Problem: Somatic Disturbance (Anxiety Signs/Symptoms)  Goal: Improved Somatic Symptoms (Anxiety Signs/Symptoms)  Outcome: Improving     Problem: Activity and Energy Impairment (Depressive Signs/Symptoms)  Goal: Optimized Energy Level (Depressive Signs/Symptoms)  Outcome: Improving     Problem: Cognitive Impairment (Depressive Signs/Symptoms)  Goal: Optimized Cognitive Function (Depressive Signs/Symptoms)  Outcome: Improving     Problem: Decreased Participation and Engagement (Depressive Signs/Symptoms)  Goal: Increased Participation and Engagement (Depressive Signs/Symptoms)  Outcome: Improving     Problem: Feelings of Worthlessness, Hopelessness or Excessive Guilt (Depressive Signs/Symptoms)  Goal: Enhanced Self-Esteem and Confidence (Depressive Signs/Symptoms)  Outcome: Improving     Problem: Mood Impairment (Depressive Signs/Symptoms)  Goal: Improved Mood Symptoms (Depressive Signs/Symptoms)  Outcome: Improving     Problem: Nutrition Imbalance (Depressive Signs/Symptoms)  Goal: Optimized Nutrition Intake (Depressive Signs/Symptoms)  Outcome: Improving     Problem: Sleep Disturbance (Depressive Signs/Symptoms)  Goal: Improved Sleep (Depressive Signs/Symptoms)  Outcome: Improving     Problem: Social, Occupational or Functional Impairment (Depressive Signs/Symptoms)  Goal: Enhanced Social, Occupational or Functional Skills (Depressive Signs/Symptoms)  Outcome: Improving     Problem:  Behavioral Health Plan of Care  Goal: Plan of Care Review  Outcome: Improving  Goal: Adheres to Safety Considerations for Self and Others  Outcome: Improving  Goal: Absence of New-Onset Illness or Injury  Outcome: Improving  Goal: Optimized Coping Skills in Response to Life Stressors  Outcome: Improving  Goal: Develops/Participates in Therapeutic [place] to Support Successful Transition  Outcome: Improving     Problem: Suicidal Behavior  Goal: Suicidal Behavior is Absent or Managed  Outcome: Improving     Problem: Sleep Disturbance  Goal: Adequate Sleep/Rest  Outcome: Improving  Pt has been up on the unit all shift. Pt has been medication compliant. Pt has attended groups. Pt endorses anxiety at 7/10. Pt continues to have delusional conversations.
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
## 2021-12-13 NOTE — PROGRESS NOTES
12/13/21 1400   Engagement   Intervention Group   Topic Detail Process: Boundaries   Attendance Attended   Patient Response Demonstrated understanding of materials provided   Concentrated on Task 15 - 30 min   Cognition Goal-directed   Mood/Affect Pleasant   Social/Behavioral Cooperative;Engaged   Thought Content Reality oriented     GROUP LENGTH (MINS):   30 m   RELEVANT PRIMARY DIAGNOSIS: Patient Active Problem List   Diagnosis     Psychosis (H)        TREATMENT MODALITY:      []CBT       []DBT       []ACT       []Interpersonal psychotherapy                                 [x]Psychoeducational therapy       []Skill development       []Other:        ATTENDANCE:        []Stayed for entire group     [x]Arrived late    [] Left early       # OF PATIENTS IN GROUP: 2   BILLABLE:     []Yes            [x]No   Notes:

## 2021-12-13 NOTE — PROGRESS NOTES
"   12/13/21 1100   Engagement   Intervention Group   Topic Detail OT: Wellness group (Jennifer) to promote physical wellness, daily living skills, stress relief/relaxation, coping skills, and self-awareness.    Attendance Attended   Patient Response Demonstrated understanding of materials provided;Was respectful;Expressed feelings/issues;Positive attitude;Prosocial behavior   Concentrated on Task duration of group   Cognition Goal-directed;Takes initiative;Follows through with task   Mood/Affect Content;Pleasant   Social/Behavioral Engaged;Cooperative;Motivated   Thought Content Reality oriented  (to both activity and content of conversation )   Goals addressed in session today Pt actively engaged in group. Positive response to participation, shared that \"it felt very empowering.\" Pleasant, encouraging/supportive to peers.      "

## 2021-12-14 PROCEDURE — 99233 SBSQ HOSP IP/OBS HIGH 50: CPT | Performed by: PSYCHIATRY & NEUROLOGY

## 2021-12-14 PROCEDURE — 128N000001 HC R&B CD/MH ADULT

## 2021-12-14 PROCEDURE — 96137 PSYCL/NRPSYC TST PHY/QHP EA: CPT | Performed by: PSYCHOLOGIST

## 2021-12-14 PROCEDURE — 250N000013 HC RX MED GY IP 250 OP 250 PS 637: Performed by: PSYCHIATRY & NEUROLOGY

## 2021-12-14 PROCEDURE — 96136 PSYCL/NRPSYC TST PHY/QHP 1ST: CPT | Performed by: PSYCHOLOGIST

## 2021-12-14 RX ADMIN — ZIPRASIDONE HCL 40 MG: 40 CAPSULE ORAL at 16:49

## 2021-12-14 RX ADMIN — ZIPRASIDONE HCL 40 MG: 40 CAPSULE ORAL at 08:28

## 2021-12-14 ASSESSMENT — MIFFLIN-ST. JEOR: SCORE: 1174.5

## 2021-12-14 NOTE — PROGRESS NOTES
"PSYCHIATRY  PROGRESS NOTE     DATE OF SERVICE   12/14/2021           CHIEF COMPLAINT   \" I am okay.\"       SUBJECTIVE   Nursing reports: Patient visible on the unit. rates anxiety 7/10 and denies all other psych symptoms. No c/o pain. Pleasant and engaged with peers. Compliant with all and medications and contracted for safety. Pt verbalized that she is sleeping well and moving to [facility] facility is positive direction for her.     Patient has been discussed in detail with the .   has arranged for the patient to start the process of applying for medical assistance and will help the patient with referrals for IRTS facilities.       OBJECTIVE   Patient was seen and evaluated at bedside by herself, this was a face-to-face evaluation.  Initially patient reported that she was feeling okay, was calm and respectful.  Patient reported feeling happy about receiving help and being able to go to an IRTS facility.  Patient tells me that she understands that at this time she is homeless and does not have a car but he is feeling hopeful that with the assistance that we are providing her she will be able to be back on her feet.      When I asked the patient how she feels about the scam patient said that she does not believe that she has been scammed because she has been in communication with over 20 people.  Patient does not want to believe that someone can be so cruel to the point of lying to her in the way that they did.  Patient tells me that she did had very intimate conversations with who she thinks is Quique Barnett, she feels sad about the possibility of these not being reality.  Patient even told me that she talked with this person (Quique Barnett) about having children and she even picked out a name for the child that they were going to have.  I did talk to the patient about her being 56 and how possible it is for her to have any children's at this point in her life.  Patient said that they were going " "to have the child via in vitro fertilization.  She also insist that Mr. Barnett bought her home.  I asked the patient if she got any confirmation that this house was put under her name like the title of the home.  Patient became very irritable and then started screaming at this writer saying that I was asking \"stupid fucking questions\".  Patient informed me that she signed multiple documents through her email but she did not got any copies back.  Patient also said that she has been insisting for her sister to email these people at [email or web address] but her sister has refused.  I did mentioned to the patient that if her sister emails these people then there is a risk that her email can be hacked.  Patient then got more angry and started verbalizing a lot of profanity saying that her sister can use her own email (patient's email) to communicate with these people that work with the [employer] and verify that in fact Ericka bought her a house.  At that time given that the patient was escalating I decided to end the meeting.  When I was about to leave the room the psychologist came to speak with the patient.  Patient did agreed to meet with the psychologist and complete the assessment       MEDICATIONS   Medications:  Scheduled Meds:    ziprasidone  40 mg Oral BID w/meals     Continuous Infusions:  PRN Meds:.acetaminophen, alum & mag hydroxide-simethicone, hydrOXYzine, OLANZapine **OR** OLANZapine, traZODone    Medication adherence issues: MS Med Adherence Y/N: Yes, Hospitalization  Medication side effects: MEDICATION SIDE EFFECTS: no side effects reported  Benefit: Yes / No: Yes       ROS   A comprehensive review of systems was negative.       MENTAL STATUS EXAM   Vitals: /62 (BP Location: Left arm, Patient Position: Sitting)   Pulse 79   Temp 98.7  F (37.1  C) (Oral)   Resp 16   Ht 1.651 m (5' 5\")   Wt 57.9 kg (127 lb 9 oz)   SpO2 99%   BMI 21.23 kg/m      Appearance:  No apparent " distress  Mood:  {Mood: Elevated   Affect: Grandiose, irritable and angry was congruent to speech  Suicidal Ideation: PRESENT / ABSENT: absent   Homicidal Ideation: PRESENT / ABSENT: absent   Thought process: circumstantial and overinclusive    Thought content: significant for delusions [details in Interim History].   Fund of Knowledge: Average  Attention/Concentration: Normal  Language ability:  Intact  Memory:  Immediate recall intact, Short-term memory intact and Long-term memory intact  Insight:  limited.  Judgement: fair  Orientation: Yes, x4  Psychomotor Behavior: normal or unremarkable    Muscle Strength and Tone: MuscleStrength: Normal  Gait and Station: Normal       LABS   personally reviewed.     No results found for: PHENYTOIN, PHENOBARB, VALPROATE, CBMZ       DIAGNOSIS   Principal Problem:    Psychosis (H)    Active Problem List:  Patient Active Problem List   Diagnosis     Psychosis (H)          PLAN   1. Ongoing education given regarding diagnostic and treatment options with risks, benefits and alternatives and adequate verbalization of understanding.  2.  Medications:       Geodon to 40 mg 2 times a day    3.  Medical team to follow-up as needed  4.   coordinating a safe discharge plan with family.  5.  Patient has been discussed in detail with the psychologist.    Risk Assessment: [facility] RISK ASSESSMENT: Patient able to contract for safety    Coordination of Care:   Treatment Plan reviewed and physician signed, Care discussed with Care/Treatment Team Members, Chart reviewed and Patient seen      Re-Certification I certify that the inpatient psychiatric facility services furnished since the previous certification were, and continue to be, medically necessary for, either, treatment which could reasonably be expected to improve the patient s condition or diagnostic study and that the hospital records indicate that the services furnished were, either, intensive treatment services, admission  and related services necessary for diagnostic study, or equivalent services.     I certify that the patient continues to need, on a daily basis, active treatment furnished directly by or requiring the supervision of inpatient psychiatric facility personnel.   I estimate 14 days of hospitalization is necessary for proper treatment of the patient. My plans for post-hospital care for this patient are  IRTS facility     Kathy Martinez MD    -     12/14/2021  -    11 AM    Total time  35 minutes with > 50%spent on coordination of cares and psycho-education.    This note was created with help of Dragon dictation system. Grammatical / typing errors are not intentional.    Kathy Martinez MD

## 2021-12-14 NOTE — PLAN OF CARE
Problem: Activity and Energy Impairment (Anxiety Signs/Symptoms)  Goal: Optimized Energy Level (Anxiety Signs/Symptoms)  Outcome: Improving     Problem: Cognitive Impairment (Anxiety Signs/Symptoms)  Goal: Optimized Cognitive Function (Anxiety Signs/Symptoms)  Outcome: Improving     Problem: Mood Impairment (Anxiety Signs/Symptoms)  Goal: Improved Mood Symptoms (Anxiety Signs/Symptoms)  Outcome: Improving     Problem: Sleep Disturbance (Anxiety Signs/Symptoms)  Goal: Improved Sleep (Anxiety Signs/Symptoms)  Outcome: Improving     Problem: Somatic Disturbance (Anxiety Signs/Symptoms)  Goal: Improved Somatic Symptoms (Anxiety Signs/Symptoms)  Outcome: Improving     Problem: Activity and Energy Impairment (Depressive Signs/Symptoms)  Goal: Optimized Energy Level (Depressive Signs/Symptoms)  Outcome: Improving     Problem: Cognitive Impairment (Depressive Signs/Symptoms)  Goal: Optimized Cognitive Function (Depressive Signs/Symptoms)  Outcome: Improving     Problem: Decreased Participation and Engagement (Depressive Signs/Symptoms)  Goal: Increased Participation and Engagement (Depressive Signs/Symptoms)  Outcome: Improving     Problem: Feelings of Worthlessness, Hopelessness or Excessive Guilt (Depressive Signs/Symptoms)  Goal: Enhanced Self-Esteem and Confidence (Depressive Signs/Symptoms)  Outcome: Improving     Problem: Mood Impairment (Depressive Signs/Symptoms)  Goal: Improved Mood Symptoms (Depressive Signs/Symptoms)  Outcome: Improving     Problem: Nutrition Imbalance (Depressive Signs/Symptoms)  Goal: Optimized Nutrition Intake (Depressive Signs/Symptoms)  Outcome: Improving     Problem: Sleep Disturbance (Depressive Signs/Symptoms)  Goal: Improved Sleep (Depressive Signs/Symptoms)  Outcome: Improving     Problem: Social, Occupational or Functional Impairment (Depressive Signs/Symptoms)  Goal: Enhanced Social, Occupational or Functional Skills (Depressive Signs/Symptoms)  Outcome: Improving     Problem:  Behavioral Health Plan of Care  Goal: Plan of Care Review  Outcome: Improving  Goal: Adheres to Safety Considerations for Self and Others  Outcome: Improving  Goal: Absence of New-Onset Illness or Injury  Outcome: Improving  Goal: Optimized Coping Skills in Response to Life Stressors  Outcome: Improving  Goal: Develops/Participates in Therapeutic [place] to Support Successful Transition  Outcome: Improving     Problem: Suicidal Behavior  Goal: Suicidal Behavior is Absent or Managed  Outcome: Improving     Problem: Sleep Disturbance  Goal: Adequate Sleep/Rest  Outcome: Improving   Pt has been out on the unit most of this shift. Pt did paper tests in her room. Pt denies all psych symptoms. Pt pleasant and cooperative. Pt medication compliant. Pt is less delusional compared to yesterday. Pt not as tearful also.

## 2021-12-14 NOTE — PLAN OF CARE
"Assessment/Intervention/Current Symtoms and Care Coordination  Writer met with patient to discuss discharge plans. Writer provided information regarding IRTS facilities. Patient stated that she wanted to remain in the [facility] to use public transit. Patient will look over IRTS information and discuss with writer tomorrow. Patient participated in group on the stages of change. Patient stated that she would like to change her tone/demeanor when meeting with the psychiatrist. She stated that she thinks her angry tone impacts other's abilities to hear her concerns. Patient then stated that she was concerned about the \"e mail\" that needed to be sent. Patient actively participated and at times appeared to be conflicted about remaining in the hospital. She expressed that at times she feels forced to be here and at times she realizes that this may be helpful for her.      Discharge Plan or Goal:   IRTS with case management, psychiatry and therapy    Barriers to Discharge   Care coordination, medication management, symptom stabilization    Referral Status-   pending- MA application Financial SW will be on site Wednesday 12/15. Application for MA has been provided to patient.    Legal Status  Voluntary      Lenora Huang, [facility], [facility] , 12/13/2021, 2:19 PM   "

## 2021-12-14 NOTE — PLAN OF CARE
Problem: Activity and Energy Impairment (Anxiety Signs/Symptoms)  Goal: Optimized Energy Level (Anxiety Signs/Symptoms)  Intervention: Optimize Energy Level  Recent Flowsheet Documentation  Taken 12/13/2021 1617 by Carmen Gallegos RN  Diversional Activity: puzzles     Problem: Cognitive Impairment (Anxiety Signs/Symptoms)  Goal: Optimized Cognitive Function (Anxiety Signs/Symptoms)  Outcome: Improving     Problem: Mood Impairment (Anxiety Signs/Symptoms)  Goal: Improved Mood Symptoms (Anxiety Signs/Symptoms)  Outcome: Improving     Patient visible on the unit. rates anxiety 7/10 and denies all other psych symptoms. No c/o pain. Pleasant and engaged with peers. Compliant with all and medications and contracted for safety. Pt verbalized that she is sleeping well and moving to [facility] facility is positive direction for her.

## 2021-12-14 NOTE — PROGRESS NOTES
Provided patient with OZZIE and MCMI-IV to complete.  Will check in later this afternoon to see if she has completed.  Otherwise will collect in the morning.

## 2021-12-14 NOTE — PROGRESS NOTES
12/14/21 1400   Engagement   Intervention Group   Topic Detail Process: Stages of change   Attendance Attended   Patient Response Demonstrated understanding of materials provided   Concentrated on Task duration of group   Cognition Goal-directed   Mood/Affect Pleasant   Social/Behavioral Cooperative;Engaged   Thought Content Reality oriented     GROUP LENGTH (MINS):   50 m   RELEVANT PRIMARY DIAGNOSIS: Patient Active Problem List   Diagnosis     Psychosis (H)        TREATMENT MODALITY:      [x]CBT       []DBT       []ACT       []Interpersonal psychotherapy                                 []Psychoeducational therapy       []Skill development       []Other:        ATTENDANCE:        [x]Stayed for entire group     []Arrived late    [] Left early       # OF PATIENTS IN GROUP: 5   BILLABLE:     [x]Yes            []No   Notes:
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
## 2021-12-14 NOTE — PLAN OF CARE
This patient was calm and slept all night without any distress, safety checks were conducted, and no pain was reported. No PRN was given. Patient denied anxiety, depression and hallucination.Cheeking could not be assessed as no medication was due at this time.

## 2021-12-14 NOTE — PROGRESS NOTES
12/14/21 1113   Engagement   Intervention Group   Topic Detail qigong, chair yoga, breath work and tennis ball massage for wellbeing, relaxation, coping, leisure and socializing   Attendance Attended   Patient Response Demonstrated understanding of materials provided;Was respectful;Expressed positive beliefs about self;Improved mood in response to group   Concentrated on Task duration of group   Cognition Goal-directed   Mood/Affect Pleasant;Bright   Social/Behavioral Cooperative;Engaged   Goals addressed in session today pt had positive response to activities and reported her head ache went away.

## 2021-12-14 NOTE — PROGRESS NOTES
12/14/21 1530   Engagement   Intervention Group   Topic Detail sand art and snow flake designs for creativity, sequencing, problem solving, relaxation, leisure, and socializing   Attendance Attended   Patient Response Demonstrated understanding of materials provided;Was respectful;Positive attitude   Concentrated on Task duration of group   Cognition Goal-directed   Mood/Affect Pleasant   Social/Behavioral Cooperative;Engaged   Goals addressed in session today Pt finished sand art and cut out one snowflake. Pt social with peers and offer music suggestions

## 2021-12-15 PROCEDURE — 96130 PSYCL TST EVAL PHYS/QHP 1ST: CPT | Performed by: PSYCHOLOGIST

## 2021-12-15 PROCEDURE — 250N000013 HC RX MED GY IP 250 OP 250 PS 637: Performed by: PSYCHIATRY & NEUROLOGY

## 2021-12-15 PROCEDURE — 96131 PSYCL TST EVAL PHYS/QHP EA: CPT | Performed by: PSYCHOLOGIST

## 2021-12-15 PROCEDURE — 128N000001 HC R&B CD/MH ADULT

## 2021-12-15 PROCEDURE — 90832 PSYTX W PT 30 MINUTES: CPT | Performed by: PSYCHOLOGIST

## 2021-12-15 RX ADMIN — ZIPRASIDONE HCL 40 MG: 40 CAPSULE ORAL at 08:12

## 2021-12-15 RX ADMIN — ZIPRASIDONE HCL 40 MG: 40 CAPSULE ORAL at 17:20

## 2021-12-15 ASSESSMENT — ACTIVITIES OF DAILY LIVING (ADL)
DRESS: STREET CLOTHES;INDEPENDENT
HYGIENE/GROOMING: HANDWASHING;INDEPENDENT
ORAL_HYGIENE: INDEPENDENT

## 2021-12-15 NOTE — PROGRESS NOTES
12/15/21 1122   Engagement   Intervention Group   Topic Detail Coping thru the Senses for wellbeing, grounding, coping and self care   Attendance Attended   Patient Response Demonstrated understanding of materials provided;Was respectful;Improved mood in response to group;Positive attitude   Concentrated on Task 15 - 30 min   Cognition Goal-directed   Mood/Affect Pleasant   Social/Behavioral Cooperative;Engaged   Goals addressed in session today Pt joined group home through gp after meeting with the dr. Pt set up with grounding essential oil and a sound machine. Pt also ID'd drinking tea and chewing gum as tools she'd like to use. Pt asked for assist with painting her nails.

## 2021-12-15 NOTE — PROGRESS NOTES
I have tried to meet with the patient today twice but she was either in groups or sleeping.  I will follow-up with the patient tomorrow.    Kathy Martinez MD
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
## 2021-12-15 NOTE — PROGRESS NOTES
12/15/21 4140   Engagement   Intervention Group   Topic Detail Origami wreaths for concentration, following directions, sequencing, leisure, socializing, coping   Attendance Attended   Patient Response Needs reinforcement/repetition to learn materials;Was respectful;Positive attitude   Concentrated on Task duration of group   Mood/Affect Pleasant   Social/Behavioral Cooperative;Engaged   Goals addressed in session today Pt required additional cues and assist for assembly of folded paper to make a wreath.
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
## 2021-12-15 NOTE — PLAN OF CARE
Problem: Activity and Energy Impairment (Anxiety Signs/Symptoms)  Goal: Optimized Energy Level (Anxiety Signs/Symptoms)  Outcome: Improving     Problem: Cognitive Impairment (Anxiety Signs/Symptoms)  Goal: Optimized Cognitive Function (Anxiety Signs/Symptoms)  Outcome: Improving     Problem: Mood Impairment (Anxiety Signs/Symptoms)  Goal: Improved Mood Symptoms (Anxiety Signs/Symptoms)  Outcome: Improving     Problem: Sleep Disturbance (Anxiety Signs/Symptoms)  Goal: Improved Sleep (Anxiety Signs/Symptoms)  Outcome: Improving     Problem: Somatic Disturbance (Anxiety Signs/Symptoms)  Goal: Improved Somatic Symptoms (Anxiety Signs/Symptoms)  Outcome: Improving     Problem: Activity and Energy Impairment (Depressive Signs/Symptoms)  Goal: Optimized Energy Level (Depressive Signs/Symptoms)  Outcome: Improving     Problem: Cognitive Impairment (Depressive Signs/Symptoms)  Goal: Optimized Cognitive Function (Depressive Signs/Symptoms)  Outcome: Improving     Problem: Decreased Participation and Engagement (Depressive Signs/Symptoms)  Goal: Increased Participation and Engagement (Depressive Signs/Symptoms)  Outcome: Improving     Problem: Feelings of Worthlessness, Hopelessness or Excessive Guilt (Depressive Signs/Symptoms)  Goal: Enhanced Self-Esteem and Confidence (Depressive Signs/Symptoms)  Outcome: Improving     Problem: Mood Impairment (Depressive Signs/Symptoms)  Goal: Improved Mood Symptoms (Depressive Signs/Symptoms)  Outcome: Improving     Problem: Sleep Disturbance (Depressive Signs/Symptoms)  Goal: Improved Sleep (Depressive Signs/Symptoms)  Outcome: Improving     Problem: Social, Occupational or Functional Impairment (Depressive Signs/Symptoms)  Goal: Enhanced Social, Occupational or Functional Skills (Depressive Signs/Symptoms)  Outcome: Improving     Problem: Behavioral Health Plan of Care  Goal: Plan of Care Review  Outcome: Improving  Goal: Adheres to Safety Considerations for Self and Others  Outcome:  Improving  Goal: Absence of New-Onset Illness or Injury  Outcome: Improving  Goal: Optimized Coping Skills in Response to Life Stressors  Outcome: Improving  Goal: Develops/Participates in Therapeutic [place] to Support Successful Transition  Outcome: Improving     Problem: Suicidal Behavior  Goal: Suicidal Behavior is Absent or Managed  Outcome: Improving     Problem: Sleep Disturbance  Goal: Adequate Sleep/Rest  Outcome: Improving   Pt has been out on the unit all this shift. Pt denies all psych symptoms. Pt appears less delusional today. PT attended groups. Pt has been medication compliant.

## 2021-12-15 NOTE — PLAN OF CARE
Patient was calm and non-aggressive, she slept all night without any visible distress. Safety checks were conducted every 15 minutes per unit protocol. No pain was reported and no PRN was given. Patient denied anxiety, depression and hallucination. No AM medication was due at this time, and patient could not be assessed for cheeking of medication.

## 2021-12-15 NOTE — PROGRESS NOTES
10:15: Attempted to meet with patient but she was meeting with her  and then with a specialist (10:30).  Will re-attempt later.

## 2021-12-16 PROCEDURE — 128N000001 HC R&B CD/MH ADULT

## 2021-12-16 PROCEDURE — 99232 SBSQ HOSP IP/OBS MODERATE 35: CPT | Performed by: PSYCHIATRY & NEUROLOGY

## 2021-12-16 PROCEDURE — 250N000013 HC RX MED GY IP 250 OP 250 PS 637: Performed by: PSYCHIATRY & NEUROLOGY

## 2021-12-16 RX ADMIN — ZIPRASIDONE HCL 40 MG: 40 CAPSULE ORAL at 08:25

## 2021-12-16 RX ADMIN — ZIPRASIDONE HCL 40 MG: 40 CAPSULE ORAL at 16:57

## 2021-12-16 ASSESSMENT — ACTIVITIES OF DAILY LIVING (ADL)
DRESS: INDEPENDENT
HYGIENE/GROOMING: INDEPENDENT
ORAL_HYGIENE: INDEPENDENT

## 2021-12-16 NOTE — PLAN OF CARE
This patient did not exhibit any suicidal behavior,she was calm on this shift and slept all night without any distress.Safety checks were conducted and documented every 15 minutes per unit policy. No pain  was reported and no PRN given. Patient denied anxiety, depression and hallucination. Cheeking of medication was not assessed as no medication was due at this time.
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
## 2021-12-16 NOTE — PLAN OF CARE
Assessment/Intervention/Current Symtoms and Care Coordination  Writer met with patient to discuss discharge plans. Patient remains in agreement with plans to transition to an IRTS. Patient was on her way to group and preferred to go to group then to meet at this time. Patient is aware that her MA is pending and referrals will be sent to IRTS programs. Patient expressed that she has no concerns at this time. Patient was calm, dismissive  and cooperative with writer.    Discharge Plan or Goal:   IRTS with case management, psychiatry and therapy    Barriers to Discharge   Care coordination, medication management, symptom stabilization    Referral Status-   pending- MA application Financial SW met with patient and completed application online. Potential to complete process within 2 days.    Patient has signed JEREMÍAS's for [employer] and [employer]. [facility] and/or [facility]    Legal Status  Voluntary      Lenora Huang, [facility], [facility] , 12/16/2021, 2:19 PM

## 2021-12-16 NOTE — PROGRESS NOTES
12/16/21 3070   Engagement   Intervention Group   Topic Detail Seasonal creative endeavor (holiday card making) for reality orientation, healthy leisure, planning and organizing, socializing, and coping with sx through distraction.   Attendance Attended   Patient Response Demonstrated understanding of materials provided;Improved mood in response to group   Concentrated on Task duration of group   Cognition Goal-directed   Mood/Affect Pleasant;Content   Social/Behavioral Cooperative;Engaged;Poor boundaries   Thought Content Reality oriented   Goals addressed in session today Patient a bit demanding - asked OT 3x about the unit borrowing the Vindicia game for the evening. Therapist left the game with the charge nurse to be used under supervision with unit staff.

## 2021-12-16 NOTE — PLAN OF CARE
Problem: Activity and Energy Impairment (Anxiety Signs/Symptoms)  Goal: Optimized Energy Level (Anxiety Signs/Symptoms)  Outcome: Improving     Problem: Feelings of Worthlessness, Hopelessness or Excessive Guilt (Depressive Signs/Symptoms)  Goal: Enhanced Self-Esteem and Confidence (Depressive Signs/Symptoms)  Outcome: Improving     Problem: Mood Impairment (Depressive Signs/Symptoms)  Goal: Improved Mood Symptoms (Depressive Signs/Symptoms)  Outcome: Improving     Problem: Behavioral Health Plan of Care  Goal: Plan of Care Review  Outcome: Improving  Goal: Adheres to Safety Considerations for Self and Others  Outcome: Improving   Pt isolative in room first half of this shift and was visible for meals, participated in group activities after noon. Pt denied SI, HI anxiety, depression and contracted for safety. No discomfort observed and was med complaint.
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
## 2021-12-16 NOTE — PLAN OF CARE
Problem: Activity and Energy Impairment (Anxiety Signs/Symptoms)  Goal: Optimized Energy Level (Anxiety Signs/Symptoms)  Outcome: Improving  Intervention: Optimize Energy Level  Recent Flowsheet Documentation  Taken 12/15/2021 1800 by Joselin Colon RN  Diversional Activity: art work     Problem: Cognitive Impairment (Anxiety Signs/Symptoms)  Goal: Optimized Cognitive Function (Anxiety Signs/Symptoms)  Outcome: Improving     Problem: Mood Impairment (Anxiety Signs/Symptoms)  Goal: Improved Mood Symptoms (Anxiety Signs/Symptoms)  Outcome: Improving     Problem: Sleep Disturbance (Anxiety Signs/Symptoms)  Goal: Improved Sleep (Anxiety Signs/Symptoms)  Outcome: Improving     Problem: Somatic Disturbance (Anxiety Signs/Symptoms)  Goal: Improved Somatic Symptoms (Anxiety Signs/Symptoms)  Outcome: Improving     Problem: Activity and Energy Impairment (Depressive Signs/Symptoms)  Goal: Optimized Energy Level (Depressive Signs/Symptoms)  Outcome: Improving  Intervention: Optimize Energy Level  Recent Flowsheet Documentation  Taken 12/15/2021 1800 by Joselin Colon RN  Diversional Activity: art work     Problem: Cognitive Impairment (Depressive Signs/Symptoms)  Goal: Optimized Cognitive Function (Depressive Signs/Symptoms)  Outcome: Improving     Problem: Decreased Participation and Engagement (Depressive Signs/Symptoms)  Goal: Increased Participation and Engagement (Depressive Signs/Symptoms)  Outcome: Improving  Intervention: Facilitate Participation and Engagement  Recent Flowsheet Documentation  Taken 12/15/2021 1800 by Joselin Colon RN  Diversional Activity: art work     Problem: Feelings of Worthlessness, Hopelessness or Excessive Guilt (Depressive Signs/Symptoms)  Goal: Enhanced Self-Esteem and Confidence (Depressive Signs/Symptoms)  Outcome: Improving     Problem: Mood Impairment (Depressive Signs/Symptoms)  Goal: Improved Mood Symptoms (Depressive Signs/Symptoms)  Outcome: Improving  Intervention: Promote  Mood Improvement  Recent Flowsheet Documentation  Taken 12/15/2021 1800 by Joselin Colon RN  Diversional Activity: art work     Problem: Sleep Disturbance (Depressive Signs/Symptoms)  Goal: Improved Sleep (Depressive Signs/Symptoms)  Outcome: Improving     Problem: Social, Occupational or Functional Impairment (Depressive Signs/Symptoms)  Goal: Enhanced Social, Occupational or Functional Skills (Depressive Signs/Symptoms)  Outcome: Improving  Intervention: Promote Social, Occupational and Functional Ability  Recent Flowsheet Documentation  Taken 12/15/2021 1800 by Joselin Colon RN  Trust Relationship/Rapport:    care explained    choices provided    questions answered    thoughts/feelings acknowledged     Problem: Behavioral Health Plan of Care  Goal: Plan of Care Review  Outcome: Improving  Flowsheets (Taken 12/15/2021 1800)  Plan of Care Reviewed With: patient  Patient Agreement with Plan of Care: agrees  Goal: Patient-Specific Goal (Individualization)  Outcome: Improving  Goal: Adheres to Safety Considerations for Self and Others  Outcome: Improving  Intervention: Develop and Maintain Individualized Safety Plan  Recent Flowsheet Documentation  Taken 12/15/2021 1800 by Joselin Colon RN  Safety Measures:    environmental rounds completed    safety rounds completed  Goal: Absence of New-Onset Illness or Injury  Outcome: Improving  Intervention: Identify and Manage Fall Risk  Recent Flowsheet Documentation  Taken 12/15/2021 1800 by Joselin Colon RN  Safety Measures:    environmental rounds completed    safety rounds completed  Goal: Optimized Coping Skills in Response to Life Stressors  Outcome: Improving  Goal: Develops/Participates in Therapeutic [place] to Support Successful Transition  Outcome: Improving  Intervention: Foster Therapeutic [place]  Recent Flowsheet Documentation  Taken 12/15/2021 1800 by Joselin Colon RN  Trust Relationship/Rapport:    care explained    choices provided     questions answered    thoughts/feelings acknowledged     Problem: Suicidal Behavior  Goal: Suicidal Behavior is Absent or Managed  Outcome: Improving     Problem: Sleep Disturbance  Goal: Adequate Sleep/Rest  Outcome: Improving   Pt. Calm, cooperative and med complaint. Pt has been up on the unit all shift engages with select peers, playing cards and do the art work. Pt denies SI/HI/hallucinations and  all psych symptoms. Contracts for safety. Pt denies pain, depression and anxiety. Pt denies having delusional thoughts. Pt pleasant with staff on approach.

## 2021-12-17 LAB
FOLATE SERPL-MCNC: 9.5 NG/ML
SARS-COV-2 RNA RESP QL NAA+PROBE: NEGATIVE
VIT B12 SERPL-MCNC: 327 PG/ML (ref 213–816)

## 2021-12-17 PROCEDURE — 36415 COLL VENOUS BLD VENIPUNCTURE: CPT | Performed by: PSYCHIATRY & NEUROLOGY

## 2021-12-17 PROCEDURE — 90832 PSYTX W PT 30 MINUTES: CPT | Performed by: PSYCHOLOGIST

## 2021-12-17 PROCEDURE — 250N000013 HC RX MED GY IP 250 OP 250 PS 637: Performed by: PSYCHIATRY & NEUROLOGY

## 2021-12-17 PROCEDURE — 128N000001 HC R&B CD/MH ADULT

## 2021-12-17 PROCEDURE — 82607 VITAMIN B-12: CPT | Performed by: PSYCHIATRY & NEUROLOGY

## 2021-12-17 PROCEDURE — 99233 SBSQ HOSP IP/OBS HIGH 50: CPT | Performed by: PSYCHIATRY & NEUROLOGY

## 2021-12-17 PROCEDURE — 82306 VITAMIN D 25 HYDROXY: CPT | Performed by: PSYCHIATRY & NEUROLOGY

## 2021-12-17 PROCEDURE — 82746 ASSAY OF FOLIC ACID SERUM: CPT | Performed by: PSYCHIATRY & NEUROLOGY

## 2021-12-17 PROCEDURE — 90853 GROUP PSYCHOTHERAPY: CPT

## 2021-12-17 PROCEDURE — 87635 SARS-COV-2 COVID-19 AMP PRB: CPT | Performed by: PSYCHIATRY & NEUROLOGY

## 2021-12-17 RX ORDER — CITALOPRAM HYDROBROMIDE 10 MG/1
10 TABLET ORAL DAILY
Status: DISCONTINUED | OUTPATIENT
Start: 2021-12-17 | End: 2021-12-27

## 2021-12-17 RX ADMIN — ZIPRASIDONE HCL 40 MG: 40 CAPSULE ORAL at 17:00

## 2021-12-17 RX ADMIN — CITALOPRAM HYDROBROMIDE 10 MG: 10 TABLET ORAL at 15:51

## 2021-12-17 RX ADMIN — ZIPRASIDONE HCL 40 MG: 40 CAPSULE ORAL at 08:52

## 2021-12-17 ASSESSMENT — ACTIVITIES OF DAILY LIVING (ADL)
ORAL_HYGIENE: INDEPENDENT
HYGIENE/GROOMING: INDEPENDENT
DRESS: STREET CLOTHES;INDEPENDENT
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
## 2021-12-17 NOTE — PLAN OF CARE
"  Problem: Mood Impairment (Anxiety Signs/Symptoms)  Goal: Improved Mood Symptoms (Anxiety Signs/Symptoms)  Outcome: No Change     Problem: Behavioral Health Plan of Care  Goal: Adheres to Safety Considerations for Self and Others  Outcome: No Change  Intervention: Develop and Maintain Individualized Safety Plan  Recent Flowsheet Documentation  Taken 12/17/2021 0854 by Karla Leon RN  Safety Measures:    safety rounds completed    suicide assessment completed     Problem: Behavioral Health Plan of Care  Goal: Plan of Care Review  Outcome: No Change    Pt is alert and oriented. She is calm, pleasant and cooperative with care. Med compliant. Attending groups.   Pt denies any pain concerns at this time.   She rates anxiety 2/10, denies depression but inquires about possibly starting an antidepressant. States \"after a discussion with my sister, I realized I cry easily. And have never really loved myself.\" She was encouraged to discuss this with her Provider.   She denies SI/HI. No delusional thought content noted during interaction.        "

## 2021-12-17 NOTE — PROVIDER NOTIFICATION
12/17/21 1400   Engagement   Intervention Group   Topic Insight development/self-awareness   Topic Detail Positivity    Attendance Attended   Patient Response Expressed feelings/issues;Accepted feedback;Was respectful   Concentrated on Task duration of group   Cognition Goal-directed;Takes initiative   Mood/Affect Congruent   Social/Behavioral Cooperative;Engaged   Thought Content Insightful     GROUP LENGTH (MINS):   45 minutes   RELEVANT PRIMARY DIAGNOSIS: Patient Active Problem List   Diagnosis     Psychosis (H)        TREATMENT MODALITY:      [x]CBT       []DBT       []ACT       []Interpersonal psychotherapy                                 []Psychoeducational therapy       []Skill development       []Other:        ATTENDANCE:        [x]Stayed for entire group     []Arrived late    [] Left early       # OF PATIENTS IN GROUP: 3   BILLABLE:     [x]Yes            []No   Notes:

## 2021-12-17 NOTE — PROGRESS NOTES
"Psychology Psychotherapy  Note       Name:  Beatris Cuellar  :  1965  MRN:  [id number]      Date: 2021  Duration:  17 minutes (11:00-11:17am)     Target Symptoms:    The patient was seen in light of concerns regarding symptoms of continued progression in psychosis and mood stability.     Participation:  The patient was able to participate and benefit from treatment as evidenced by her verbal expression of ideas and initiation of topics discussed.     Mental Status:  Level of Consciousness:  alert  Appearance:  well groomed  Attitude:  Attitude: open and cooperative  Speech: somewhat soft spoken, normal rate, rhythm, and tone  Language ability: intact  Mood:  \"sad\"  Affect: full range, mildly tearful and appropriate and was congruent to speech  Suicidal Ideation: No  Homicidal Ideation: No  Thought formation: coherent and goal directed  Thought content:  Clear   Fundamentals of Knowledge: Average  Attention/Concentration: Normal  Memory: recent and remote memory intact  Insight:  building.  Judgement: improving  Orientation: Yes, x4  Psychomotor Behavior: normal or unremarkable    Estimated IQ: IQ: average    These cognitive functions grossly appear as described, but were not formally tested.          Intervention:    Writer approached patient in the [facility] where she was coloring.  She was agreeable to meet for individual psychotherapy follow-up.  Patient described having a really good session with her  earlier this morning and practicing self compassion.  Patient became slightly tearful talking about this and how she is feeling hopeful for the future as well as working trusting herself and loving herself.  Patient stated she feels loved by others but does not love herself.  Denied ever loving herself in the past.  Patient was able to remain future and reality oriented throughout session.  Patient expressed desire to have some labs drawn regarding possible vitamin deficiencies.  Encouraged " her to talk to her provider about this as well as follow up on previous conversations she reportedly had with the provider regarding using her own make-up.     Psychotherapeutic Techniques: Interpersonal Psychotherapy, Cognitive-behavioral therapy, motivational interviewing and supportive psychotherapy strategies were utilized.     Necessity:    The session was necessary for the care of the patient to address symptoms of continued progression in psychosis and mood stability.     Progress:    Patient has shown improvement in her ability to utilize coping skills to address symptoms of continued progression in psychosis and mood stability.     Plan:    Writer will be out for the next two weeks.  If patient is still in the hospital 1/3 will resume services.     Diagnosis:    Psychosis  Rule out Bipolar with Psychotic Features       Provider: Clarissa Wilson PsyD, LP  Date: 12/17/2021

## 2021-12-17 NOTE — PLAN OF CARE
Problem: Activity and Energy Impairment (Anxiety Signs/Symptoms)  Goal: Optimized Energy Level (Anxiety Signs/Symptoms)  12/17/2021 0532 by Alexsander Peña RN  Outcome: Improving  12/16/2021 2120 by Alexsander Peña RN  Outcome: Improving     Problem: Cognitive Impairment (Anxiety Signs/Symptoms)  Goal: Optimized Cognitive Function (Anxiety Signs/Symptoms)  12/17/2021 0532 by Alexsander Peña RN  Outcome: Improving  12/16/2021 2120 by Alexsander Peña RN  Outcome: Improving     Problem: Mood Impairment (Anxiety Signs/Symptoms)  Goal: Improved Mood Symptoms (Anxiety Signs/Symptoms)  12/17/2021 0532 by Alexsander Peña RN  Outcome: Improving  12/16/2021 2120 by Alexsander Peña RN  Outcome: Improving     Problem: Sleep Disturbance (Anxiety Signs/Symptoms)  Goal: Improved Sleep (Anxiety Signs/Symptoms)  Outcome: Improving     Problem: Somatic Disturbance (Anxiety Signs/Symptoms)  Goal: Improved Somatic Symptoms (Anxiety Signs/Symptoms)  12/17/2021 0532 by Alexsander Peña RN  Outcome: Improving  12/16/2021 2120 by Alexsander Peña RN  Outcome: Improving     Problem: Activity and Energy Impairment (Depressive Signs/Symptoms)  Goal: Optimized Energy Level (Depressive Signs/Symptoms)  Outcome: Improving     Problem: Cognitive Impairment (Depressive Signs/Symptoms)  Goal: Optimized Cognitive Function (Depressive Signs/Symptoms)  Outcome: Improving     Problem: Decreased Participation and Engagement (Depressive Signs/Symptoms)  Goal: Increased Participation and Engagement (Depressive Signs/Symptoms)  Outcome: Improving   Pt appears to be sleeping comfortably during all safety checks. No signs of pain or discomfort noted. Will continue to monitor.

## 2021-12-17 NOTE — PROGRESS NOTES
12/17/21 1530   Engagement   Intervention Group   Topic Detail OT Creative Expressions group-Holiday card making for social engagement, creativity, healthy distraction, symptom management, and concentration   Attendance Attended   Patient Response Demonstrated understanding of materials provided;Expressed feelings/issues;Asked questions and/or took notes;Was respectful   Concentrated on Task duration of group   Cognition Goal-directed;Sequences task;Attends to detail   Mood/Affect Content   Social/Behavioral Cooperative;Engaged   Goals addressed in session today pt actively engaged in group. pt continued coloring her holiday card during group. pt was polite and pleasant during interactions with staff and peers. pt expressed gratitude for binary puzzle printouts provided

## 2021-12-17 NOTE — PLAN OF CARE
"Alert, pleasant, social, and compliant with medications. Out in milieu most of the shift, spent shift playing games and watching tv. Denied anxiety or depression \"I feel very calm right now\" verbalized optimism, denied any delusional thinking or hallucinations, no SI/HI, contracted for safety. Appeared in no distress, denied pain or discomfort.   Problem: Cognitive Impairment (Anxiety Signs/Symptoms)  Goal: Optimized Cognitive Function (Anxiety Signs/Symptoms)  Outcome: Improving  Flowsheets (Taken 12/17/2021 1721)  Mutually Determined Action Steps (Optimized Cognitive Function): contributes to treatment plan     Problem: Mood Impairment (Anxiety Signs/Symptoms)  Goal: Improved Mood Symptoms (Anxiety Signs/Symptoms)  Outcome: Improving  Flowsheets (Taken 12/17/2021 1721)  Mutually Determined Action Steps (Improved Mood Symptoms): adheres to medication regimen     Problem: Somatic Disturbance (Anxiety Signs/Symptoms)  Goal: Improved Somatic Symptoms (Anxiety Signs/Symptoms)  Outcome: Improving  Flowsheets (Taken 12/17/2021 1721)  Mutually Determined Action Steps (Improved Somatic Symptoms): rates anxiety level via scale     Problem: Activity and Energy Impairment (Depressive Signs/Symptoms)  Goal: Optimized Energy Level (Depressive Signs/Symptoms)  Outcome: Improving  Flowsheets (Taken 12/17/2021 1721)  Mutually Determined Action Steps (Optimized Energy Level): grooms self without prompting     Problem: Decreased Participation and Engagement (Depressive Signs/Symptoms)  Goal: Increased Participation and Engagement (Depressive Signs/Symptoms)  Outcome: Improving  Flowsheets (Taken 12/17/2021 1721)  Mutually Determined Action Steps (Increased Participation and Engagement):    participates in one or more activity    initiates interaction with others     Problem: Behavioral Health Plan of Care  Goal: Adheres to Safety Considerations for Self and Others  Outcome: Improving  Intervention: Develop and Maintain " Individualized Safety Plan  Recent Flowsheet Documentation  Taken 12/17/2021 1700 by Dallas Snyder RN  Safety Measures:    safety rounds completed    suicide assessment completed    environmental rounds completed  Goal: Absence of New-Onset Illness or Injury  Outcome: Improving  Intervention: Identify and Manage Fall Risk  Recent Flowsheet Documentation  Taken 12/17/2021 1700 by Dallas Snyder RN  Safety Measures:    safety rounds completed    suicide assessment completed    environmental rounds completed  Goal: Optimized Coping Skills in Response to Life Stressors  Outcome: Improving  Goal: Develops/Participates in Therapeutic [place] to Support Successful Transition  Outcome: Improving     Problem: Suicidal Behavior  Goal: Suicidal Behavior is Absent or Managed  Outcome: Improving  Flowsheets (Taken 12/17/2021 1721)  Mutually Determined Action Steps (Suicidal Behavior Absent/Managed): verbalizes safety check rationale     Problem: Activity and Energy Impairment (Anxiety Signs/Symptoms)  Goal: Optimized Energy Level (Anxiety Signs/Symptoms)  Recent Flowsheet Documentation  Taken 12/17/2021 1721 by Dallas Snyder RN  Mutually Determined Action Steps (Optimized Energy Level): grooms self without prompting     Problem: Behavioral Health Plan of Care  Goal: Plan of Care Review  Recent Flowsheet Documentation  Taken 12/17/2021 1700 by Dallas Snyder RN  Plan of Care Reviewed With: patient  Patient Agreement with Plan of Care: agrees

## 2021-12-17 NOTE — PLAN OF CARE
Assessment/Intervention/Current Symtoms and Care Coordination  Writer met with patient to discuss discharge plans. Patient  Remains in agreement with discharge plans to an IRTS. Patient processed a discussion with sister regarding POA. Patient is unsure at this time if she would juan a her sister POA. Patient was updated regarding status of IRTS referrals.     Next steps follow up with referrals and schedule psychiatry    Discharge Plan or Goal:   IRTS with case management, psychiatry and therapy    Barriers to Discharge   Care coordination, medication management, symptom stabilization    Referral Status-   pending- [facility] has verified receiving referral. [facility] has indicated the availability at the [place] location may become available first. All three locations are close to public transit     Writer has left a message for [employer]. Referral has not been sent to [facility] ([facility]) due to patient's preference for other locations      [place] application Approved on 12/16  MA Number # [id number]  .    Patient has signed JEREMÍAS's for [facility], [facility] and [facility]. Patient prefers [facility] location and/or [place] house    Legal Status  Voluntary      Lenora Huang, [facility], [facility] , 12/16/2021, 2:19 PM

## 2021-12-17 NOTE — PLAN OF CARE
Problem: Activity and Energy Impairment (Anxiety Signs/Symptoms)  Goal: Optimized Energy Level (Anxiety Signs/Symptoms)  Outcome: Improving     Problem: Cognitive Impairment (Anxiety Signs/Symptoms)  Goal: Optimized Cognitive Function (Anxiety Signs/Symptoms)  Outcome: Improving     Problem: Mood Impairment (Anxiety Signs/Symptoms)  Goal: Improved Mood Symptoms (Anxiety Signs/Symptoms)  Outcome: Improving     Problem: Sleep Disturbance (Anxiety Signs/Symptoms)  Goal: Improved Sleep (Anxiety Signs/Symptoms)  Outcome: Improving     Problem: Somatic Disturbance (Anxiety Signs/Symptoms)  Goal: Improved Somatic Symptoms (Anxiety Signs/Symptoms)  Outcome: Improving     Problem: Activity and Energy Impairment (Depressive Signs/Symptoms)  Goal: Optimized Energy Level (Depressive Signs/Symptoms)  Outcome: Improving     Problem: Cognitive Impairment (Depressive Signs/Symptoms)  Goal: Optimized Cognitive Function (Depressive Signs/Symptoms)  Outcome: Improving     Problem: Decreased Participation and Engagement (Depressive Signs/Symptoms)  Goal: Increased Participation and Engagement (Depressive Signs/Symptoms)  Outcome: Improving   Pt denies all psych symptoms and contracts for safety. Up and in the milieu all shift interacting with a select peer. Pt is pleasant, affect is flat, she is cooperative, and medication compliant.  Pt's sister visited and she stated that the visit went well. No concerns noted or verbalized. Will continue with same plan of care.

## 2021-12-17 NOTE — PROGRESS NOTES
12/17/21 1109   Engagement   Intervention Group   Topic Detail OT Wellness group-[place] for cognitive wellness, focus, concentration, healthy distraction, scanning, symptom management, engagement and following directions   Attendance Attended   Patient Response Demonstrated understanding of materials provided;Expressed feelings/issues;Improved mood in response to group;Prosocial behavior;Contributes to conversation;Was respectful;Accepted feedback;Asked questions and/or took notes   Concentrated on Task duration of group   Cognition Goal-directed;Sequences task;Attends to detail   Mood/Affect Content;Anxious   Social/Behavioral Cooperative;Engaged;Redirectable   Goals addressed in session today pt actively engaged in group activity. pt shared she does binary puzzles for her cognitive wellness. pt endorsed positive response to [place] activity. pt was independent with task following initial instructions and demonstration. at the end of group pt shared an unpleasant experience she had last evening at the end of her visit with her sister-pt was receptive to affirmation of her feelings and experience.

## 2021-12-18 PROCEDURE — 90853 GROUP PSYCHOTHERAPY: CPT

## 2021-12-18 PROCEDURE — 128N000001 HC R&B CD/MH ADULT

## 2021-12-18 PROCEDURE — 250N000013 HC RX MED GY IP 250 OP 250 PS 637: Performed by: PSYCHIATRY & NEUROLOGY

## 2021-12-18 RX ADMIN — CITALOPRAM HYDROBROMIDE 10 MG: 10 TABLET ORAL at 08:58

## 2021-12-18 RX ADMIN — ZIPRASIDONE HCL 40 MG: 40 CAPSULE ORAL at 08:58

## 2021-12-18 RX ADMIN — ZIPRASIDONE HCL 40 MG: 40 CAPSULE ORAL at 18:09

## 2021-12-18 ASSESSMENT — MIFFLIN-ST. JEOR: SCORE: 1181.87

## 2021-12-18 ASSESSMENT — ACTIVITIES OF DAILY LIVING (ADL)
ORAL_HYGIENE: INDEPENDENT
HYGIENE/GROOMING: HANDWASHING;INDEPENDENT
DRESS: STREET CLOTHES;INDEPENDENT

## 2021-12-18 NOTE — PLAN OF CARE
Pleasant, cooperative, present in milieu most of the shift. Social and engaged with peers and staff members. Watched tv and worked with peer on Puzzle. She denied anxiety or depression, denied delusional thinking. No SI/HI, contracted for safety. Denied pain or discomfort.  Problem: Cognitive Impairment (Anxiety Signs/Symptoms)  Goal: Optimized Cognitive Function (Anxiety Signs/Symptoms)  Outcome: Improving  Flowsheets (Taken 12/17/2021 1721)  Mutually Determined Action Steps (Optimized Cognitive Function): contributes to treatment plan     Problem: Mood Impairment (Anxiety Signs/Symptoms)  Goal: Improved Mood Symptoms (Anxiety Signs/Symptoms)  Outcome: Improving  Flowsheets (Taken 12/18/2021 1735)  Mutually Determined Action Steps (Improved Mood Symptoms): adheres to medication regimen     Problem: Behavioral Health Plan of Care  Goal: Adheres to Safety Considerations for Self and Others  Outcome: Improving  Intervention: Develop and Maintain Individualized Safety Plan  Recent Flowsheet Documentation  Taken 12/18/2021 1600 by Dallas Snyder RN  Safety Measures:    safety rounds completed    environmental rounds completed  Goal: Absence of New-Onset Illness or Injury  Outcome: Improving  Intervention: Identify and Manage Fall Risk  Recent Flowsheet Documentation  Taken 12/18/2021 1600 by Dallas Snyder, RN  Safety Measures:    safety rounds completed    environmental rounds completed  Goal: Optimized Coping Skills in Response to Life Stressors  Outcome: Improving  Goal: Develops/Participates in Therapeutic [place] to Support Successful Transition  Outcome: Improving     Problem: Suicidal Behavior  Goal: Suicidal Behavior is Absent or Managed  Outcome: Improving  Flowsheets (Taken 12/18/2021 1735)  Mutually Determined Action Steps (Suicidal Behavior Absent/Managed): sets future-oriented goal

## 2021-12-18 NOTE — PROGRESS NOTES
12/18/21 1229   Engagement   Intervention Group   Topic Detail OT Creative Expressions group-Holiday card making for creativity, social engagement, focus, symptom management, healthy distraction and problem solving   Attendance Attended   Patient Response Demonstrated understanding of materials provided;Expressed feelings/issues   Concentrated on Task 30 - 45 min   Cognition Goal-directed;Attends to detail   Mood/Affect Content   Social/Behavioral Cooperative   Goals addressed in session today pt arrived to group after family visit. pt working quietly and dilligently on holiday card. pt engaged appropriately with staff and peers

## 2021-12-18 NOTE — PROGRESS NOTES
12/18/21 1229   Engagement   Intervention Group   Topic Detail OT Creative Expressions group-Holiday card making for creativity, social engagement, focus, symptom management, healthy distraction and problem solving   Attendance Attended   Patient Response Demonstrated understanding of materials provided;Expressed feelings/issues   Concentrated on Task 30 - 45 min   Cognition Goal-directed;Attends to detail   Mood/Affect Content   Social/Behavioral Cooperative   Goals addressed in session today pt arrived to group after family visit. pt working quietly and dilligently on holiday card. pt engaged appropriately with staff and peers     GROUP LENGTH (MINS):   50m   RELEVANT PRIMARY DIAGNOSIS: Patient Active Problem List   Diagnosis     Psychosis (H)        TREATMENT MODALITY:      []CBT       []DBT       []ACT       [x]Interpersonal psychotherapy                                 []Psychoeducational therapy       []Skill development       []Other:        ATTENDANCE:        [x]Stayed for entire group     []Arrived late    [] Left early       # OF PATIENTS IN GROUP: 8   BILLABLE:     [x]Yes            []No   Notes:

## 2021-12-18 NOTE — PLAN OF CARE
Problem: Mood Impairment (Anxiety Signs/Symptoms)  Goal: Improved Mood Symptoms (Anxiety Signs/Symptoms)  Outcome: No Change     Problem: Behavioral Health Plan of Care  Goal: Plan of Care Review  Outcome: No Change     Problem: Behavioral Health Plan of Care  Goal: Adheres to Safety Considerations for Self and Others  Outcome: No Change  Intervention: Develop and Maintain Individualized Safety Plan  Recent Flowsheet Documentation  Taken 12/18/2021 0858 by Karla Leon RN  Safety Measures:    safety rounds completed    suicide assessment completed     Problem: Suicidal Behavior  Goal: Suicidal Behavior is Absent or Managed  Outcome: No Change     Pt is alert and oriented. She is calm, pleasant and cooperative with care. She denies any complaints or concerns. No delusional thought content noted. Med compliant. Pt is tolerating Celexa with no reported side effects. Pt denies anxiety or depression. Denies SI/HI. She is social on the unit and attending groups. She is independent with ADLs. She had a visit with her sister today.   Pt denies any pain concerns at this time.

## 2021-12-19 LAB — DEPRECATED CALCIDIOL+CALCIFEROL SERPL-MC: 39 UG/L (ref 30–80)

## 2021-12-19 PROCEDURE — 250N000011 HC RX IP 250 OP 636: Performed by: PSYCHIATRY & NEUROLOGY

## 2021-12-19 PROCEDURE — 250N000013 HC RX MED GY IP 250 OP 250 PS 637: Performed by: PSYCHIATRY & NEUROLOGY

## 2021-12-19 PROCEDURE — 90682 RIV4 VACC RECOMBINANT DNA IM: CPT | Performed by: PSYCHIATRY & NEUROLOGY

## 2021-12-19 PROCEDURE — 128N000001 HC R&B CD/MH ADULT

## 2021-12-19 PROCEDURE — G0008 ADMIN INFLUENZA VIRUS VAC: HCPCS | Performed by: PSYCHIATRY & NEUROLOGY

## 2021-12-19 RX ADMIN — CITALOPRAM HYDROBROMIDE 10 MG: 10 TABLET ORAL at 08:49

## 2021-12-19 RX ADMIN — ZIPRASIDONE HCL 40 MG: 40 CAPSULE ORAL at 16:57

## 2021-12-19 RX ADMIN — ACETAMINOPHEN 650 MG: 325 TABLET ORAL at 06:45

## 2021-12-19 RX ADMIN — ZIPRASIDONE HCL 40 MG: 40 CAPSULE ORAL at 08:49

## 2021-12-19 RX ADMIN — INFLUENZA A VIRUS A/WISCONSIN/588/2019 (H1N1) RECOMBINANT HEMAGGLUTININ ANTIGEN, INFLUENZA A VIRUS A/TASMANIA/503/2020 (H3N2) RECOMBINANT HEMAGGLUTININ ANTIGEN, INFLUENZA B VIRUS B/WASHINGTON/02/2019 RECOMBINANT HEMAGGLUTININ ANTIGEN, AND INFLUENZA B VIRUS B/PHUKET/3073/2013 RECOMBINANT HEMAGGLUTININ ANTIGEN 0.5 ML: 45; 45; 45; 45 INJECTION INTRAMUSCULAR at 08:49

## 2021-12-19 ASSESSMENT — ACTIVITIES OF DAILY LIVING (ADL)
HYGIENE/GROOMING: HANDWASHING;INDEPENDENT
DRESS: STREET CLOTHES;INDEPENDENT
ORAL_HYGIENE: INDEPENDENT

## 2021-12-19 NOTE — PLAN OF CARE
Problem: Mood Impairment (Depressive Signs/Symptoms)  Goal: Improved Mood Symptoms (Depressive Signs/Symptoms)  Outcome: No Change     Problem: Behavioral Health Plan of Care  Goal: Plan of Care Review  Outcome: No Change     Problem: Behavioral Health Plan of Care  Goal: Adheres to Safety Considerations for Self and Others  Outcome: No Change  Intervention: Develop and Maintain Individualized Safety Plan  Recent Flowsheet Documentation  Taken 12/19/2021 0849 by Karla Leon RN  Safety Measures:    safety rounds completed    suicide assessment completed     Pt is alert and oriented.   Pleasant, cooperative, present in milieu most of the shift and social with peers and staff. Attended group.    She denied anxiety or depression, no evidence of delusional thought content.   Denies SI/HI, contracted for safety.   Denied pain or discomfort.  Tolerating Celexa but concerned that headache may have been a SE. Received flu shot today.   Recent labs all WNL.

## 2021-12-19 NOTE — PLAN OF CARE
Problem: Behavioral Health Plan of Care  Goal: Adheres to Safety Considerations for Self and Others  Intervention: Develop and Maintain Individualized Safety Plan  Recent Flowsheet Documentation  Taken 12/19/2021 0139 by Karrie Pham, RN  Safety Measures:    environmental rounds completed    safety rounds completed     Problem: Sleep Disturbance (Anxiety Signs/Symptoms)  Goal: Improved Sleep (Anxiety Signs/Symptoms)  Outcome: Improving      Patient was observed sleeping for most part of the night. Safety checks in place per protocol. She continues on suicide and cheeking precautions, no related behaviors noted. She had 7 hrs of sleep. Prn Tylenol 650 mg given at 0645 hrs for headache 7/10.

## 2021-12-20 PROCEDURE — 250N000013 HC RX MED GY IP 250 OP 250 PS 637: Performed by: PSYCHIATRY & NEUROLOGY

## 2021-12-20 PROCEDURE — 128N000001 HC R&B CD/MH ADULT

## 2021-12-20 PROCEDURE — 99232 SBSQ HOSP IP/OBS MODERATE 35: CPT | Performed by: PSYCHIATRY & NEUROLOGY

## 2021-12-20 RX ADMIN — ZIPRASIDONE HCL 40 MG: 40 CAPSULE ORAL at 08:24

## 2021-12-20 RX ADMIN — ZIPRASIDONE HCL 40 MG: 40 CAPSULE ORAL at 17:10

## 2021-12-20 RX ADMIN — CITALOPRAM HYDROBROMIDE 10 MG: 10 TABLET ORAL at 08:23

## 2021-12-20 ASSESSMENT — ACTIVITIES OF DAILY LIVING (ADL)
DRESS: SCRUBS (BEHAVIORAL HEALTH)
HYGIENE/GROOMING: INDEPENDENT
DRESS: STREET CLOTHES
ORAL_HYGIENE: INDEPENDENT
HYGIENE/GROOMING: HANDWASHING;SHOWER;INDEPENDENT
ORAL_HYGIENE: INDEPENDENT

## 2021-12-20 NOTE — PLAN OF CARE
"Patient tells this writer this am \"I'm good, I'm happy and wishing to move on to the next step in my healing\" pat is med compliant, intake is adequate and interactions with peers is appropriate. Patient denies SI/HI, all hallucinations and contracts for safety, denies depression and anxiety. Continues to attend and participate in group activities. Has been noted to sleep 8+ hours night. Continues to c/o dry mouth and swollen lips. All test have resulted in negative or WNL, continue to monitor these complaints closely  Problem: Activity and Energy Impairment (Anxiety Signs/Symptoms)  Goal: Optimized Energy Level (Anxiety Signs/Symptoms)  Outcome: Improving     Problem: Mood Impairment (Anxiety Signs/Symptoms)  Goal: Improved Mood Symptoms (Anxiety Signs/Symptoms)  Outcome: Improving     Problem: Somatic Disturbance (Anxiety Signs/Symptoms)  Goal: Improved Somatic Symptoms (Anxiety Signs/Symptoms)  Outcome: Improving     "

## 2021-12-20 NOTE — PLAN OF CARE
Alert and oriented x4, able to communicate needs. Visible in milieu, social and engaged with peers and staff members. Pleasant, cooperative and compliant with medications. Endorsed depression at 4, denied any anxiety, no SI/HI, contracted for safety. No delusions or hallucinations. Denied pain.  Problem: Cognitive Impairment (Anxiety Signs/Symptoms)  Goal: Optimized Cognitive Function (Anxiety Signs/Symptoms)  Outcome: Improving  Flowsheets (Taken 12/17/2021 1721)  Mutually Determined Action Steps (Optimized Cognitive Function): contributes to treatment plan     Problem: Mood Impairment (Anxiety Signs/Symptoms)  Goal: Improved Mood Symptoms (Anxiety Signs/Symptoms)  Outcome: Improving  Flowsheets (Taken 12/19/2021 1824)  Mutually Determined Action Steps (Improved Mood Symptoms): adheres to medication regimen     Problem: Behavioral Health Plan of Care  Goal: Adheres to Safety Considerations for Self and Others  Outcome: Improving  Intervention: Develop and Maintain Individualized Safety Plan  Recent Flowsheet Documentation  Taken 12/19/2021 1639 by Dallas Snyder, RN  Safety Measures:    environmental rounds completed    safety rounds completed  Goal: Absence of New-Onset Illness or Injury  Outcome: Improving  Intervention: Identify and Manage Fall Risk  Recent Flowsheet Documentation  Taken 12/19/2021 1639 by Dallas Snyder, RN  Safety Measures:    environmental rounds completed    safety rounds completed  Goal: Optimized Coping Skills in Response to Life Stressors  Outcome: Improving  Goal: Develops/Participates in Therapeutic [place] to Support Successful Transition  Outcome: Improving     Problem: Suicidal Behavior  Goal: Suicidal Behavior is Absent or Managed  Outcome: Improving  Flowsheets (Taken 12/18/2021 1735)  Mutually Determined Action Steps (Suicidal Behavior Absent/Managed): sets future-oriented goal     Problem: Activity and Energy Impairment (Anxiety Signs/Symptoms)  Goal: Optimized Energy  Level (Anxiety Signs/Symptoms)  Intervention: Optimize Energy Level  Recent Flowsheet Documentation  Taken 12/19/2021 1639 by Dallas Snyder RN  Patient Performed Hygiene: teeth brushed  Diversional Activity: television  Activity (Behavioral Health): activity encouraged     Problem: Activity and Energy Impairment (Depressive Signs/Symptoms)  Goal: Optimized Energy Level (Depressive Signs/Symptoms)  Intervention: Optimize Energy Level  Recent Flowsheet Documentation  Taken 12/19/2021 1639 by Dallas Snyder RN  Patient Performed Hygiene: teeth brushed  Diversional Activity: television  Activity (Behavioral Health): activity encouraged     Problem: Decreased Participation and Engagement (Depressive Signs/Symptoms)  Goal: Increased Participation and Engagement (Depressive Signs/Symptoms)  Intervention: Facilitate Participation and Engagement  Recent Flowsheet Documentation  Taken 12/19/2021 1639 by Dallas Snyder RN  Diversional Activity: television     Problem: Mood Impairment (Depressive Signs/Symptoms)  Goal: Improved Mood Symptoms (Depressive Signs/Symptoms)  Intervention: Promote Mood Improvement  Recent Flowsheet Documentation  Taken 12/19/2021 1639 by Dallas Snyder RN  Diversional Activity: television     Problem: Behavioral Health Plan of Care  Goal: Plan of Care Review  Recent Flowsheet Documentation  Taken 12/19/2021 1639 by Dallas Snyder RN  Plan of Care Reviewed With: patient  Patient Agreement with Plan of Care: agrees

## 2021-12-20 NOTE — PROGRESS NOTES
"PSYCHIATRY  PROGRESS NOTE     DATE OF SERVICE   12/20/2021           CHIEF COMPLAINT   \" I am okay.\"       SUBJECTIVE/OBJECTIVE     Patient has been calm and cooperative on the unit. She still has some depression. She is not voicing delusions but is somewhat guarded and staff suspects she still holds to delusions. She is visible on unit and interacts with staff and peers. She is agreeable to IRTS. She is tolerating her medications and denies physical complaints today    Patient was discussed in treatment team with . IRTS referrals are being made         MEDICATIONS   Medications:  Scheduled Meds:    citalopram  10 mg Oral Daily     ziprasidone  40 mg Oral BID w/meals     Continuous Infusions:  PRN Meds:.acetaminophen, alum & mag hydroxide-simethicone, hydrOXYzine, OLANZapine **OR** OLANZapine, traZODone    Medication adherence issues: MS Med Adherence Y/N: Yes, Hospitalization  Medication side effects: MEDICATION SIDE EFFECTS: no side effects reported  Benefit: Yes / No: Yes       ROS   A comprehensive review of systems was negative.  Review of Systems is otherwise negative including HEENT, CV, Respiratory, GI, , Musculoskeletal, Neurologic, Dermatologic, Endocrine, Immunological, Constitutional systems       MENTAL STATUS EXAM   Vitals: /66 (BP Location: Left arm, Patient Position: Sitting)   Pulse 67   Temp 98.8  F (37.1  C) (Oral)   Resp 18   Ht 1.651 m (5' 5\")   Wt 58.6 kg (129 lb 3 oz)   SpO2 95%   BMI 21.50 kg/m      Appearance:  No apparent distress  Mood: \"I am feeling better\"  Affect: Calm and pleasant  Suicidal Ideation: PRESENT / ABSENT: absent   Homicidal Ideation: PRESENT / ABSENT: absent   Thought process: Linear and goal-directed  Thought content: No delusions or psychotic symptoms have been elicited, but can be guarded and lacks insight  Fund of Knowledge: Average  Attention/Concentration: Normal  Language ability:  Intact  Memory:  Immediate recall intact, Short-term " memory intact and Long-term memory intact  Insight: Improving  Judgement: fair  Orientation: Yes, x4  Psychomotor Behavior: normal or unremarkable    Muscle Strength and Tone: MuscleStrength: Normal  Gait and Station: Normal       LABS   personally reviewed.     No results found for: PHENYTOIN, PHENOBARB, VALPROATE, CBMZ       DIAGNOSIS   Principal Problem:    Psychosis (H)    Active Problem List:  Patient Active Problem List   Diagnosis     Psychosis (H)          PLAN   1. Ongoing education given regarding diagnostic and treatment options with risks, benefits and alternatives and adequate verbalization of understanding.  2.  Medications:       Geodon  40 mg 2 times a day    citalopram  10 mg Oral Daily     ziprasidone  40 mg Oral BID w/meals       3.  Medical team to follow-up as needed  4.   coordinating a safe discharge plan with family, anticipate IRTS    No further change in treatment plan    Risk Assessment: [facility] RISK ASSESSMENT: Patient able to contract for safety    Coordination of Care:   Treatment Plan reviewed and physician signed, Care discussed with Care/Treatment Team Members, Chart reviewed and Patient seen  Case reviewed in multi-disciplinary treatment team.  More than 25 minutes spent on this visit with more than 50% time spent on coordination of care with staff, reviewing medical record, psychoeducation, providing supportive therapy regarding coping with chronic mental illness, entering orders and preparing documentation for the visit        Re-Certification I certify that the inpatient psychiatric facility services furnished since the previous certification were, and continue to be, medically necessary for, either, treatment which could reasonably be expected to improve the patient s condition or diagnostic study and that the hospital records indicate that the services furnished were, either, intensive treatment services, admission and related services necessary for diagnostic  study, or equivalent services.     I certify that the patient continues to need, on a daily basis, active treatment furnished directly by or requiring the supervision of inpatient psychiatric facility personnel.   I estimate 14 days of hospitalization is necessary for proper treatment of the patient. My plans for post-hospital care for this patient are  [facility] facility      Poncho Nails MD

## 2021-12-20 NOTE — PLAN OF CARE
BEHAVIORAL TEAM DISCUSSION    Participants: RN: MARIAM CTC: Al ECHOLS Provider: Poncho PRADHAN MD, OT: YAZAN Pharmacy: [facility] Psychology: none  Progress: awaiting review of IRTS referrals  Anticipated length of stay: 3-5 days  Continued Stay Criteria/Rationale: symptom stabilization, medication management, coordination of care  Medical/Physical: None reported   Precautions: None reported   Behavioral Orders   Procedures    [place] Precautions (behavioral units)    Code 1 - Restrict to Unit    Routine Programming     As clinically indicated    Status 15     Every 15 minutes.    Suicide precautions     Patients on Suicide Precautions should have a Combination Diet ordered that includes a Diet selection(s) AND a Behavioral Tray selection for Safe Tray - with utensils, or Safe Tray - NO utensils       Plan: IRTS  Rationale for change in precautions or plan: N/A

## 2021-12-20 NOTE — PROGRESS NOTES
12/20/21 1415   Engagement   Intervention Group   Topic Detail OT: Education and practicing mindfulness techniques (Progressive Muscle relaxation, deep breathing, essential oils, etc.) to explore relaxation techniques, coping with stress/symptoms, daily living skills, and self-awareness.    Attendance Attended   Patient Response Demonstrated understanding of materials provided;Expressed feelings/issues;Was respectful;Prosocial behavior   Concentrated on Task duration of group   Cognition Goal-directed;Attends to detail   Mood/Affect Content;Pleasant   Social/Behavioral Engaged;Motivated   Goals addressed in session today Pt participated actively with essential oils, progressive muscle relaxation, and nail care. 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 Reported feeling relaxed following PMR.

## 2021-12-20 NOTE — PLAN OF CARE
Assessment/Intervention, Care Coordination and Current Symptoms:   CTC met with patient in absence of her primary CTC. Patient was in her room reading when writer approached. Patient was calm, pleasant, and engaged writer in a friendly conversation. She denied new or worsening psych symptoms. Writer informed patient that he is monitoring her primary CTC's voicemail for call backs from the IRTS programs patient was referred to last week.     Patient requested a weighted blanket. CTC will share patient request with OT.         Discharge Plan or Goal:  IRTS with case management, psychiatry, and psychotherapy     Barriers to Discharge:  symptom stabilization, medication management, coordination of care     Referral Status:    pending- [facility] IRTS has verified receiving referral. [facility] has indicated the availability at the [place] location may become available first. All three locations are close to public transit     Referral has not been sent to [employer] ([facility]) due to patient's preference for other locations     MA application Approved on 12/16  MA Number # [id number]  .  Patient has signed JEREMÍAS's for [facility], [facility] and [facility]. [facility]or [place] house     Legal Status:  Voluntary      Al Zhong MA, [facility], 12/20/2021, 1:31 PM

## 2021-12-20 NOTE — PLAN OF CARE
Problem: Behavioral Health Plan of Care  Goal: Absence of New-Onset Illness or Injury  Outcome: Improving     Problem: Suicidal Behavior  Goal: Suicidal Behavior is Absent or Managed  Outcome: Improving     Problem: Sleep Disturbance  Goal: Adequate Sleep/Rest  Outcome: Improving  Patient passed the night uneventfully, slept through the night without requesting for prn medications neither complaints of pain, anxiety or discomfort. Patient observed sleeping with even, non labored respirations during safety checks. Patient was cooperative with no behavioral/mood dysregulations. Patient endorses no SI/HI, A/VH or SIB. No evidence of withdrawal or distress. Overall, patient achieves greater than 7 hours of good quality sleep. Will continue to monitor and follow plan of care.     Rl Cano. DNP, RN, APRN, CNS, AGCNS-BC

## 2021-12-20 NOTE — PROGRESS NOTES
"   12/20/21 1100   Engagement   Intervention Group   Topic Detail OT: Wellness group (exercise and cognitive jenga) to promote gross motor movement, attention/sequencing, coping skills, and opportunity for positive social interactions.   Attendance Attended   Patient Response Demonstrated understanding of materials provided;Expressed feelings/issues;Was respectful;Prosocial behavior;Improved mood in response to group   Concentrated on Task duration of group   Cognition Goal-directed;Attends to detail;Sequences task   Mood/Affect Anxious;Pleasant   Social/Behavioral Engaged;Cooperative   Thought Content Reality oriented  (to all aspects of activity )   Goals addressed in session today Pt participated actively in group. Voiced frustrations initially regarding room changes that are happening on the unit, though is able to understand and rationalize that all decisions made are for pt safety. Pt shared that her coping skills include \"removing myself from the situation, praying, and reading.\" Participated in both cognitive challenges and movements/exercises. Supportive/encouraging of peers.      "

## 2021-12-21 PROBLEM — F20.0 ACUTE EXACERBATION OF CHRONIC PARANOID SCHIZOPHRENIA (H): Status: ACTIVE | Noted: 2021-12-21

## 2021-12-21 PROCEDURE — 128N000001 HC R&B CD/MH ADULT

## 2021-12-21 PROCEDURE — 250N000013 HC RX MED GY IP 250 OP 250 PS 637: Performed by: PSYCHIATRY & NEUROLOGY

## 2021-12-21 PROCEDURE — 99231 SBSQ HOSP IP/OBS SF/LOW 25: CPT | Performed by: NURSE PRACTITIONER

## 2021-12-21 PROCEDURE — 99231 SBSQ HOSP IP/OBS SF/LOW 25: CPT | Performed by: PSYCHIATRY & NEUROLOGY

## 2021-12-21 PROCEDURE — 99207 PR CONSULT E&M CHANGED TO SUBSEQUENT LEVEL: CPT | Performed by: NURSE PRACTITIONER

## 2021-12-21 RX ORDER — SALIVA STIMULANT COMB. NO.3
2 SPRAY, NON-AEROSOL (ML) MUCOUS MEMBRANE 4 TIMES DAILY PRN
Status: DISCONTINUED | OUTPATIENT
Start: 2021-12-21 | End: 2021-12-29 | Stop reason: HOSPADM

## 2021-12-21 RX ADMIN — ZIPRASIDONE HCL 40 MG: 40 CAPSULE ORAL at 08:25

## 2021-12-21 RX ADMIN — ZIPRASIDONE HCL 40 MG: 40 CAPSULE ORAL at 17:13

## 2021-12-21 RX ADMIN — CITALOPRAM HYDROBROMIDE 10 MG: 10 TABLET ORAL at 08:25

## 2021-12-21 ASSESSMENT — ACTIVITIES OF DAILY LIVING (ADL)
ORAL_HYGIENE: INDEPENDENT
DRESS: INDEPENDENT
HYGIENE/GROOMING: INDEPENDENT

## 2021-12-21 ASSESSMENT — MIFFLIN-ST. JEOR: SCORE: 1172.12

## 2021-12-21 NOTE — CONSULTS
56-year-old female admitted inpatient psychiatry unit [facility] for acute on chronic paranoid schizophrenia.    Hospital medicine service consulted by psychiatry for mouth numbness/discomfort.    Ongoing incisions of upper and lower lip numbness, peeling skin and dry mouth, and sensation of puffy lips for the past 2 months. Brushes teeth twice daily and flosses regularly. Drinking lots of water. Generally puts on lipstick and Vaseline daily.  Has not changed lip products recently.  She denies recent trauma or fall to the face.  She feels lip numbness symmetrically.  Denies drooling.  Denies personal history of CVA. Not on chemotherapy.      Of note head CT during this admission negative for acute intracranial findings.    Denies pertinent family history.      Physical Exam: Alert, non labored breathing, moving extremities freely, symmetric smile, no facial droop, motor strength 5/5 all extremities  Mouth: No drooling, Wearing reddish lipstick, no oral sores exterior/inside lip folds, no thrush on tongue, good dentition, I do not see significant lip dryness even with lipstick      # Xerostomia:  # Hyposalivation:  Unclear etiology. ? Medication side effect, possible volume deficit  No acute neuro deficit. Negative Head CT 12/13/2021  PRN artificial saliva spray  Maybe try chapstick instead of vaseline to lips   lemon drops to promote salivation  Both chapstick and lemon drops need to approved by psychiatrist for family to bring from home.            Medicine sign off    JELENA Merino, CNP  Hospital Medicine Service  [facility]                                                                    EXAM: CT HEAD W/O CONTRAST  LOCATION: [facility]  DATE/TIME: 12/13/2021 2:34 PM     INDICATION: Psychosis.  COMPARISON: None.  TECHNIQUE: Routine CT Head without IV contrast. Multiplanar reformats. Dose reduction techniques were  used.     FINDINGS:  INTRACRANIAL CONTENTS: No finding for intracranial hemorrhage, mass, or acute infarct. Ventricles are normal in size. Mild prominence of the sulci. Incidental small left-sided sublenticular cyst. Gray-white matter differentiation is preserved. No mass   effect or midline shift.     Cerebellar tonsils are normally positioned. Sella is unremarkable for technique. Corpus callosum is normally formed.     VISUALIZED ORBITS/SINUSES/MASTOIDS: No intraorbital abnormality. No paranasal sinus mucosal disease. No middle ear or mastoid effusion.     BONES/SOFT TISSUES: Calvarium is intact, without suspicious lytic or blastic foci. No scalp hematoma.                                                                      IMPRESSION:  1.  No finding for intracranial hemorrhage, mass, or acute infarct.

## 2021-12-21 NOTE — PROGRESS NOTES
"PSYCHIATRY  PROGRESS NOTE     DATE OF SERVICE   12/21/2021           CHIEF COMPLAINT   \" I am okay.\"       SUBJECTIVE/OBJECTIVE     Patient has been calm and cooperative on the unit. She still has some depression. She is not voicing delusions but is somewhat guarded.. She is visible on unit and interacts with staff and peers. She is agreeable to IRTS. She is tolerating her medications and denies physical complaints today other than soreness and numbness in her lips. Minimal change in mental status in the past 24 hours      IRTS referrals are being made         MEDICATIONS   Medications:  Scheduled Meds:    citalopram  10 mg Oral Daily     ziprasidone  40 mg Oral BID w/meals     Continuous Infusions:  PRN Meds:.acetaminophen, alum & mag hydroxide-simethicone, hydrOXYzine, OLANZapine **OR** OLANZapine, traZODone    Medication adherence issues: MS Med Adherence Y/N: Yes, Hospitalization  Medication side effects: MEDICATION SIDE EFFECTS: no side effects reported  Benefit: Yes / No: Yes       ROS   A comprehensive review of systems was negative.  Lip numbness/discomfort  Review of Systems is otherwise negative including HEENT, CV, Respiratory, GI, , Musculoskeletal, Neurologic, Dermatologic, Endocrine, Immunological, Constitutional systems       MENTAL STATUS EXAM   Vitals: /69 (BP Location: Right arm)   Pulse 69   Temp 98.6  F (37  C) (Oral)   Resp 18   Ht 1.651 m (5' 5\")   Wt 57.6 kg (127 lb 0.6 oz)   SpO2 99%   BMI 21.14 kg/m      Appearance:  No apparent distress  Mood: \"I am feeling better\"  Affect: Calm and pleasant  Suicidal Ideation: PRESENT / ABSENT: absent   Homicidal Ideation: PRESENT / ABSENT: absent   Thought process: Linear and goal-directed  Thought content: No delusions or psychotic symptoms have been elicited, but can be guarded and lacks insight  Fund of Knowledge: Average  Attention/Concentration: Normal  Language ability:  Intact  Memory:  Immediate recall intact, Short-term memory " intact and Long-term memory intact  Insight: Improving  Judgement: fair  Orientation: Yes, x4  Psychomotor Behavior: normal or unremarkable    Muscle Strength and Tone: MuscleStrength: Normal  Gait and Station: Normal  Minimal change in mental status in the past 24 hours       LABS   personally reviewed.     No results found for: PHENYTOIN, PHENOBARB, VALPROATE, CBMZ       DIAGNOSIS   Principal Problem:    Acute exacerbation of chronic paranoid schizophrenia (H)    Active Problem List:  Patient Active Problem List   Diagnosis     Psychosis (H)     Acute exacerbation of chronic paranoid schizophrenia (H)          PLAN   1. Ongoing education given regarding diagnostic and treatment options with risks, benefits and alternatives and adequate verbalization of understanding.  2.  Medications:       Geodon  40 mg 2 times a day    citalopram  10 mg Oral Daily     ziprasidone  40 mg Oral BID w/meals       3.  Medical team to follow-up as needed  4.   coordinating a safe discharge plan with family, anticipate IRTS    No further change in treatment plan      Risk Assessment: [facility] RISK ASSESSMENT: Patient able to contract for safety    Coordination of Care:   Treatment Plan reviewed and physician signed, Care discussed with Care/Treatment Team Members, Chart reviewed and Patient seen    Patient seen, chart reviewed, case reviewed with  and with nursing.     Re-Certification I certify that the inpatient psychiatric facility services furnished since the previous certification were, and continue to be, medically necessary for, either, treatment which could reasonably be expected to improve the patient s condition or diagnostic study and that the hospital records indicate that the services furnished were, either, intensive treatment services, admission and related services necessary for diagnostic study, or equivalent services.     I certify that the patient continues to need, on a daily basis, active  treatment furnished directly by or requiring the supervision of inpatient psychiatric facility personnel.   I estimate 10 days of hospitalization is necessary for proper treatment of the patient. My plans for post-hospital care for this patient are  [facility] [place] facility      Poncho Nails MD

## 2021-12-21 NOTE — PLAN OF CARE
Problem: Cognitive Impairment (Anxiety Signs/Symptoms)  Goal: Optimized Cognitive Function (Anxiety Signs/Symptoms)  Outcome: Improving     Problem: Feelings of Worthlessness, Hopelessness or Excessive Guilt (Depressive Signs/Symptoms)  Goal: Enhanced Self-Esteem and Confidence (Depressive Signs/Symptoms)  Outcome: Improving     Patient more alert and oriented to her environment, date, and time. Patient spent about two hours napping, but was nevertheless out for groups and meals.  Patient was compliant with medications and ADLs, though irritable at times. Patient denied SI, HI, SIB, and contracted for safety.  No  psychosis  or delusional behavior observed. Patient is overall , calm, respectful and engage. Will continue to monitor for cares.

## 2021-12-21 NOTE — PLAN OF CARE
Problem: Mood Impairment (Depressive Signs/Symptoms)  Goal: Improved Mood Symptoms (Depressive Signs/Symptoms)  Outcome: Improving  Intervention: Promote Mood Improvement  Recent Flowsheet Documentation  Taken 12/20/2021 1729 by Florencia Norris RN  Diversional Activity: television     Problem: Social, Occupational or Functional Impairment (Depressive Signs/Symptoms)  Goal: Enhanced Social, Occupational or Functional Skills (Depressive Signs/Symptoms)  Outcome: Improving     Problem: Activity and Energy Impairment (Anxiety Signs/Symptoms)  Goal: Optimized Energy Level (Anxiety Signs/Symptoms)  Intervention: Optimize Energy Level  Recent Flowsheet Documentation  Taken 12/20/2021 1729 by Florencia Norris RN  Patient Performed Hygiene:   teeth brushed   dressed  Diversional Activity: television  Activity (Behavioral Health): activity adjusted per tolerance     Problem: Decreased Participation and Engagement (Depressive Signs/Symptoms)  Goal: Increased Participation and Engagement (Depressive Signs/Symptoms)  Intervention: Facilitate Participation and Engagement  Recent Flowsheet Documentation  Taken 12/20/2021 1729 by Florencia Norris RN  Diversional Activity: television     Problem: Behavioral Health Plan of Care  Goal: Adheres to Safety Considerations for Self and Others  Intervention: Develop and Maintain Individualized Safety Plan  Recent Flowsheet Documentation  Taken 12/20/2021 1729 by Florencia Norris RN  Safety Measures: safety rounds completed  Goal: Absence of New-Onset Illness or Injury  Intervention: Identify and Manage Fall Risk  Recent Flowsheet Documentation  Taken 12/20/2021 1729 by Florencia Norris RN  Safety Measures: safety rounds completed   Pleasant and cooperative. Pt. Out in the milieu majority of the shift engaged with peers and staff members. Pt. Fixated on her numb lips and thinks she has oral cancer, was able to reassure her not true by education and recent lab values. Pt.  Frustrated on why her mouth feels numb. Went to bed after her bedtime snack.  Endorsed anxiety 2, depression 0, denies pain SI/HI and hallucinations. Contracted for safety

## 2021-12-21 NOTE — PLAN OF CARE
Assessment/Intervention, Care Coordination and Current Symptoms:   CTC met with patient in place of her primary CTC to review discharge planning. Writer informed patient a call was received from the  at [facility] seeking to schedule a formal phone assessment with the patient. Patient agreed to participate in an assessment on Monday, December 27. CTC called [facility]'s  to verify the date and set a time for the assessment.     When writer approached the patient, she was in her room reading. Patient was calm, pleasant, and engaged CTC in a friendly conversation. Patient appeared pleased with the news as evidenced by her smile and euphoric response.      CTC presented patient with an application for [place] DHS Combined Application Form in order to apply for the SNAP program. A CTC will follow-up with patient tomorrow and review application with the patient.       Discharge Plan or Goal:  IRTS with case management, psychiatry, and psychotherapy     Barriers to Discharge:  symptom stabilization, medication management, coordination of care    Referral Status:    pending- [facility] I[facility]S has verified receiving referral. [facility] has indicated the availability at the [place] location may become available first. All three locations are close to public transit     Referral has not been sent to [facility] ([facility]care) due to patient's preference for other locations     MA application Approved on 12/16  MA Number # [id number]  .  Patient has signed JEREMÍAS's for [facility], [employer] and [employer]. Patient prefers [facility] location and/or [place] house    12/21/21 - Patient was scheduled for a phone assessment with [facility] On Monday, December 27 at  9:00am       Legal Status: Voluntary      Al Zhong MA, [facility], 12/21/2021, 2:33 PM

## 2021-12-21 NOTE — PLAN OF CARE
Problem: Behavioral Health Plan of Care  Goal: Adheres to Safety Considerations for Self and Others  Intervention: Develop and Maintain Individualized Safety Plan  Recent Flowsheet Documentation  Taken 12/21/2021 0203 by Karrie Pham, RN  Safety Measures:    environmental rounds completed    safety rounds completed     Problem: Sleep Disturbance (Anxiety Signs/Symptoms)  Goal: Improved Sleep (Anxiety Signs/Symptoms)  Outcome: Improving    Patient had more than 6 hrs of uninterrupted sleep. Safety interventions in place per protocol. No concerns noted during this shift.

## 2021-12-21 NOTE — PROGRESS NOTES
12/21/21 1532   Engagement   Intervention Group   Topic Detail nature mandala's to connect with nature, meditation, creativity, socializing, and leisure   Attendance Attended   Patient Response Demonstrated understanding of materials provided;Improved mood in response to group;Positive attitude   Concentrated on Task duration of group   Cognition Goal-directed   Mood/Affect Pleasant   Social/Behavioral Cooperative;Engaged   Goals addressed in session today Pt started a mandala with feathers and was pleased with how it's turning out. Pt also set up with green pencil to use to write letters in her cards.

## 2021-12-21 NOTE — PROGRESS NOTES
12/21/21 1153   Engagement   Intervention Group   Topic Detail Accessible chair yoga and breathwork for wellbeing, coping, leisure and socializing.   Attendance Did not attend   Patient Response Improved mood in response to group;Expressed feelings/issues   Concentrated on Task duration of group   Cognition Goal-directed   Mood/Affect Pleasant   Social/Behavioral Cooperative;Engaged   Goals addressed in session today Pt stated she sleeps daily for her wellbeing and mentioned that this is what keeps her healthy. Pt reported feeling more relaxed.

## 2021-12-22 PROCEDURE — 128N000001 HC R&B CD/MH ADULT

## 2021-12-22 PROCEDURE — 99231 SBSQ HOSP IP/OBS SF/LOW 25: CPT | Performed by: PSYCHIATRY & NEUROLOGY

## 2021-12-22 PROCEDURE — 250N000013 HC RX MED GY IP 250 OP 250 PS 637: Performed by: PSYCHIATRY & NEUROLOGY

## 2021-12-22 RX ADMIN — ZIPRASIDONE HCL 40 MG: 40 CAPSULE ORAL at 08:53

## 2021-12-22 RX ADMIN — ZIPRASIDONE HCL 40 MG: 40 CAPSULE ORAL at 17:30

## 2021-12-22 RX ADMIN — CITALOPRAM HYDROBROMIDE 10 MG: 10 TABLET ORAL at 08:53

## 2021-12-22 ASSESSMENT — ACTIVITIES OF DAILY LIVING (ADL)
ORAL_HYGIENE: INDEPENDENT
HYGIENE/GROOMING: HANDWASHING;INDEPENDENT
DRESS: INDEPENDENT

## 2021-12-22 NOTE — PLAN OF CARE
Problem: Behavioral Health Plan of Care  Goal: Adheres to Safety Considerations for Self and Others  Intervention: Develop and Maintain Individualized Safety Plan  Recent Flowsheet Documentation  Taken 12/22/2021 0215 by Karrie Pham, RN  Safety Measures:   environmental rounds completed   safety rounds completed     Problem: Somatic Disturbance (Anxiety Signs/Symptoms)  Goal: Improved Somatic Symptoms (Anxiety Signs/Symptoms)  Outcome: Improving     Problem: Sleep Disturbance  Goal: Adequate Sleep/Rest  Outcome: Improving      Patient was observed sleeping comfortably in bed through the night, with breathing unlabored. Safety checks completed per unit policy. No c/o  lips numbness during this shift. Unable to do neuro checks due to patient sleeping. She had 7 hrs of sleep.

## 2021-12-22 NOTE — PLAN OF CARE
"Assessment/Intervention, Care Coordination and Current Symptoms:   Writer met with patient to discuss discharge plans. Patient stated that she was concerned that she would not be able to remain in the hospital to transition to an IRTS. She stated that she preferred a direct transfer however would make that decision after her interview with an IRTS on Monday the 27th. Writer provided patient with computer to complete her SNAP application online. Patient has completed application.     Writer and patient processed therapeutic goals and patient expressed that she becomes tearful when she thinks of self love. She stated that it is difficult to forgive herself or it is difficult to process the \"mistakes\" that she has made. Writer will provide workbooks of patient choosing.    Discharge Plan or Goal:  IRTS with case management, psychiatry, and psychotherapy     Barriers to Discharge:  symptom stabilization, medication management, coordination of care    Ref[facility] Status:    pending[facility] has indicated the availability at the [place] location may become available first. All three locations are close to public transit     Referral has not been sent to [facility] ([facility]) due to patient's preference for other locations     MA application Approved on 12/16  MA Number # [id number]  .  Patient has signed JEREMÍAS's for [employer] and [employer]. [facility] location and/or [place] house    12/21/21 - Patient was scheduled for a phone assessment with [facility]. On Monday, December 27 at  9:00am       Legal Status: Voluntary      Lenora Huang [facility], 12/22/2021, 2:33 PM      "

## 2021-12-22 NOTE — PROGRESS NOTES
12/22/21 1126   Engagement   Intervention Group   Topic Detail Mandala's (day 2) and paper crafts for sequencing, concentration, creativity, relaxation and socializing   Attendance Attended   Patient Response Demonstrated understanding of materials provided;Improved mood in response to group;Positive attitude;Expressed feelings/issues   Concentrated on Task duration of group   Cognition Goal-directed   Mood/Affect Pleasant;Bright   Social/Behavioral Cooperative;Engaged   Goals addressed in session today Pt explored visual moving sand disk, bean bag tapping and ocean drum. Pt finish her nature mandala and started coloring a mandala. Pt had positive response to activities especially tapping and nature mandala.

## 2021-12-22 NOTE — PLAN OF CARE
Problem: Mood Impairment (Depressive Signs/Symptoms)  Goal: Improved Mood Symptoms (Depressive Signs/Symptoms)  Outcome: No Change     Problem: Behavioral Health Plan of Care  Goal: Plan of Care Review  Outcome: No Change     Problem: Behavioral Health Plan of Care  Goal: Adheres to Safety Considerations for Self and Others  Outcome: No Change  Intervention: Develop and Maintain Individualized Safety Plan  Recent Flowsheet Documentation  Taken 12/22/2021 1000 by Karla Leon RN  Safety Measures:    safety rounds completed    suicide assessment completed     Pt is alert and oriented. She is calm, pleasant and cooperative with care. Independent with ADLS. Med compliant. Attending groups and social with peers and staff. Pt denies all psych sx including anxiety or depression. She denies SI/HI. No overt psychosis or delusions noted.
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
## 2021-12-22 NOTE — PLAN OF CARE
Problem: Mood Impairment (Anxiety Signs/Symptoms)  Goal: Improved Mood Symptoms (Anxiety Signs/Symptoms)  Outcome: Improving     Problem: Cognitive Impairment (Depressive Signs/Symptoms)  Goal: Optimized Cognitive Function (Depressive Signs/Symptoms)  Outcome: Improving     Problem: Activity and Energy Impairment (Anxiety Signs/Symptoms)  Goal: Optimized Energy Level (Anxiety Signs/Symptoms)  Intervention: Optimize Energy Level  Recent Flowsheet Documentation  Taken 12/21/2021 1700 by Florencia Norris RN  Patient Performed Hygiene: teeth brushed  Diversional Activity: television  Activity (Behavioral Health): activity encouraged        Problem: Decreased Participation and Engagement (Depressive Signs/Symptoms)  Goal: Increased Participation and Engagement (Depressive Signs/Symptoms)  Intervention: Facilitate Participation and Engagement  Recent Flowsheet Documentation  Taken 12/21/2021 1700 by Florencia Norris RN  Diversional Activity: television     Problem: Mood Impairment (Depressive Signs/Symptoms)  Goal: Improved Mood Symptoms (Depressive Signs/Symptoms)  Intervention: Promote Mood Improvement  Recent Flowsheet Documentation  Taken 12/21/2021 1700 by Florencia Norris RN  Diversional Activity: television     Problem: Behavioral Health Plan of Care  Goal: Plan of Care Review  Recent Flowsheet Documentation  Taken 12/21/2021 1700 by Florencia Norris RN  Plan of Care Reviewed With: patient  Patient Agreement with Plan of Care: agrees  Goal: Adheres to Safety Considerations for Self and Others  Intervention: Develop and Maintain Individualized Safety Plan  Recent Flowsheet Documentation  Taken 12/21/2021 1700 by Florencia Norris RN  Safety Measures: safety rounds completed  Goal: Absence of New-Onset Illness or Injury  Intervention: Identify and Manage Fall Risk  Recent Flowsheet Documentation  Taken 12/21/2021 1700 by Florencia Norris RN  Safety Measures: safety rounds completed       Pt.  Pleasant and cooperative out in the milieu watching TV engaged with peers and staff.  Neuro checks every four hours no abnormalities noted.  Pt. Thinks the reasoning behind her numb lips could be from drinking liquids from a straw, now drinking from a cup. Mouth moisturizer and ointment effective for dry lips and mouth. Endorsed anxiety 0, depression 0, denies pain SI/HI and hallucinations. Contracted for safety

## 2021-12-22 NOTE — PROGRESS NOTES
"PSYCHIATRY  PROGRESS NOTE     DATE OF SERVICE   12/22/2021           CHIEF COMPLAINT   \" I am okay.\"       SUBJECTIVE/OBJECTIVE     Patient has been calm and cooperative on the unit. She still has some depression. She is not voicing delusions but is somewhat guarded.. She is visible on unit and interacts with staff and peers. She is agreeable to IRTS. She is tolerating her medications and denies physical complaints today other than soreness and numbness in her lips. Minimal change in mental status in the past 24 hours      IRTS referrals are being made         MEDICATIONS   Medications:  Scheduled Meds:    citalopram  10 mg Oral Daily     ziprasidone  40 mg Oral BID w/meals     Continuous Infusions:  PRN Meds:.acetaminophen, alum & mag hydroxide-simethicone, artificial saliva, hydrOXYzine, OLANZapine **OR** OLANZapine, traZODone    Medication adherence issues: MS Med Adherence Y/N: Yes, Hospitalization  Medication side effects: MEDICATION SIDE EFFECTS: no side effects reported  Benefit: Yes / No: Yes       ROS   A comprehensive review of systems was negative.  Lip numbness/discomfort  Review of Systems is otherwise negative including HEENT, CV, Respiratory, GI, , Musculoskeletal, Neurologic, Dermatologic, Endocrine, Immunological, Constitutional systems       MENTAL STATUS EXAM   Vitals: /61 (BP Location: Right arm)   Pulse 63   Temp 97.9  F (36.6  C) (Oral)   Resp 18   Ht 1.651 m (5' 5\")   Wt 57.6 kg (127 lb 0.6 oz)   SpO2 98%   BMI 21.14 kg/m      Appearance:  No apparent distress  Mood: \"I am feeling better\"  Affect: Calm and pleasant  Suicidal Ideation: PRESENT / ABSENT: absent   Homicidal Ideation: PRESENT / ABSENT: absent   Thought process: Linear and goal-directed  Thought content: No delusions or psychotic symptoms have been elicited, but can be guarded and lacks insight  Fund of Knowledge: Average  Attention/Concentration: Normal  Language ability:  Intact  Memory:  Immediate recall intact, " Short-term memory intact and Long-term memory intact  Insight: Improving  Judgement: fair  Orientation: Yes, x4  Psychomotor Behavior: normal or unremarkable    Muscle Strength and Tone: MuscleStrength: Normal  Gait and Station: Normal    Minimal change in mental status in the past 24 hours       LABS   personally reviewed.     No results found for: PHENYTOIN, PHENOBARB, VALPROATE, CBMZ       DIAGNOSIS   Principal Problem:    Acute exacerbation of chronic paranoid schizophrenia (H)    Active Problem List:  Patient Active Problem List   Diagnosis     Psychosis (H)     Acute exacerbation of chronic paranoid schizophrenia (H)          PLAN   1. Ongoing education given regarding diagnostic and treatment options with risks, benefits and alternatives and adequate verbalization of understanding.  2.  Medications:       Geodon  40 mg 2 times a day    citalopram  10 mg Oral Daily     ziprasidone  40 mg Oral BID w/meals       3.  Medical team to follow-up as needed  4.   coordinating a safe discharge plan with family, anticipate IRTS      Risk Assessment: [facility] RISK ASSESSMENT: Patient able to contract for safety    Coordination of Care:   Treatment Plan reviewed and physician signed, Care discussed with Care/Treatment Team Members, Chart reviewed and Patient seen    No further change in treatment plan  Patient seen, chart reviewed, case reviewed with  and with nursing.     Re-Certification I certify that the inpatient psychiatric facility services furnished since the previous certification were, and continue to be, medically necessary for, either, treatment which could reasonably be expected to improve the patient s condition or diagnostic study and that the hospital records indicate that the services furnished were, either, intensive treatment services, admission and related services necessary for diagnostic study, or equivalent services.     I certify that the patient continues to need, on a  daily basis, active treatment furnished directly by or requiring the supervision of inpatient psychiatric facility personnel.   I estimate 10 days of hospitalization is necessary for proper treatment of the patient. My plans for post-hospital care for this patient are  [facility] facility      Poncho Nails MD

## 2021-12-22 NOTE — PLAN OF CARE
Patient admits to mild anxiety 3/10 d/t stressing about being homeless, she does realize she will have interview with , but was worried if accepted and there was a wait list, where would she go? Patient was reassured that SW would assist if this was the case. Denied depression and pain and continues to participate in group activities and socialize with peers appropriately, Remains med compliant and contracts for safety on unit. Thought process is appears to be stable w/o signs of hallucinations present  Problem: Activity and Energy Impairment (Anxiety Signs/Symptoms)  Goal: Optimized Energy Level (Anxiety Signs/Symptoms)  Outcome: Improving  Flowsheets (Taken 12/17/2021 1721 by Dallas Snyder RN)  Mutually Determined Action Steps (Optimized Energy Level): grooms self without prompting  Intervention: Optimize Energy Level  Recent Flowsheet Documentation  Taken 12/22/2021 1500 by Scott Nath RN  Patient Performed Hygiene:   shower   teeth brushed  Activity (Behavioral Health): activity encouraged     Problem: Cognitive Impairment (Anxiety Signs/Symptoms)  Goal: Optimized Cognitive Function (Anxiety Signs/Symptoms)  Outcome: Improving  Flowsheets (Taken 12/22/2021 1743)  Mutually Determined Action Steps (Optimized Cognitive Function): contributes to treatment plan     Problem: Mood Impairment (Anxiety Signs/Symptoms)  Goal: Improved Mood Symptoms (Anxiety Signs/Symptoms)  Outcome: Improving  Flowsheets (Taken 12/22/2021 1743)  Mutually Determined Action Steps (Improved Mood Symptoms): adheres to medication regimen  Intervention: Optimize Emotion and Mood  Recent Flowsheet Documentation  Taken 12/22/2021 1600 by Scott Nath RN  Supportive Measures: active listening utilized

## 2021-12-22 NOTE — PROGRESS NOTES
12/22/21 1526   Engagement   Intervention Group   Topic Detail bowling and social questions for leisure, turn taking, socializing and relaxing   Attendance Attended   Patient Response Demonstrated understanding of materials provided;Positive attitude;Improved mood in response to group;Prosocial behavior   Concentrated on Task duration of group   Cognition Goal-directed   Mood/Affect Pleasant   Social/Behavioral Cooperative;Engaged   Goals addressed in session today Pt declined to answer question about why medications are helpful. Pt tracked who she followed for turns and socialized with peers

## 2021-12-23 PROCEDURE — 128N000001 HC R&B CD/MH ADULT

## 2021-12-23 PROCEDURE — 250N000013 HC RX MED GY IP 250 OP 250 PS 637: Performed by: PSYCHIATRY & NEUROLOGY

## 2021-12-23 PROCEDURE — 99231 SBSQ HOSP IP/OBS SF/LOW 25: CPT | Performed by: PSYCHIATRY & NEUROLOGY

## 2021-12-23 PROCEDURE — 90853 GROUP PSYCHOTHERAPY: CPT

## 2021-12-23 RX ADMIN — ZIPRASIDONE HCL 40 MG: 40 CAPSULE ORAL at 16:53

## 2021-12-23 RX ADMIN — ZIPRASIDONE HCL 40 MG: 40 CAPSULE ORAL at 09:21

## 2021-12-23 RX ADMIN — CITALOPRAM HYDROBROMIDE 10 MG: 10 TABLET ORAL at 09:21

## 2021-12-23 ASSESSMENT — ACTIVITIES OF DAILY LIVING (ADL)
HYGIENE/GROOMING: HANDWASHING;INDEPENDENT
ORAL_HYGIENE: INDEPENDENT
DRESS: STREET CLOTHES;INDEPENDENT

## 2021-12-23 NOTE — PLAN OF CARE
Visible in milieu, pleasant, social and interactive with peers and staff members. Cooperative and compliant with medications. Patient denied pain or discomfort, independent with ADLs. Denied anxiety or depression, no SI/HI, contracted for safety, no delusional thinking.  Problem: Cognitive Impairment (Anxiety Signs/Symptoms)  Goal: Optimized Cognitive Function (Anxiety Signs/Symptoms)  Outcome: Improving  Flowsheets (Taken 12/23/2021 1751)  Mutually Determined Action Steps (Optimized Cognitive Function):    identifies thought that is not reality    contributes to treatment plan     Problem: Mood Impairment (Anxiety Signs/Symptoms)  Goal: Improved Mood Symptoms (Anxiety Signs/Symptoms)  Outcome: Improving  Flowsheets (Taken 12/23/2021 1751)  Mutually Determined Action Steps (Improved Mood Symptoms): adheres to medication regimen     Problem: Behavioral Health Plan of Care  Goal: Adheres to Safety Considerations for Self and Others  Outcome: Improving  Intervention: Develop and Maintain Individualized Safety Plan  Recent Flowsheet Documentation  Taken 12/23/2021 1706 by Dallas Snyder, RN  Safety Measures:    safety rounds completed    environmental rounds completed  Goal: Absence of New-Onset Illness or Injury  Outcome: Improving  Intervention: Identify and Manage Fall Risk  Recent Flowsheet Documentation  Taken 12/23/2021 1706 by Dallas Snyder, RN  Safety Measures:    safety rounds completed    environmental rounds completed  Goal: Optimized Coping Skills in Response to Life Stressors  Outcome: Improving  Goal: Develops/Participates in Therapeutic [place] to Support Successful Transition  Outcome: Improving     Problem: Activity and Energy Impairment (Anxiety Signs/Symptoms)  Goal: Optimized Energy Level (Anxiety Signs/Symptoms)  Intervention: Optimize Energy Level  Recent Flowsheet Documentation  Taken 12/23/2021 1706 by Dallas Snyder RN  Diversional Activity: television  Activity (Behavioral  Health): up ad pawel     Problem: Activity and Energy Impairment (Depressive Signs/Symptoms)  Goal: Optimized Energy Level (Depressive Signs/Symptoms)  Intervention: Optimize Energy Level  Recent Flowsheet Documentation  Taken 12/23/2021 1706 by Dallas Snyder RN  Diversional Activity: television  Activity (Behavioral Health): up ad pawle     Problem: Decreased Participation and Engagement (Depressive Signs/Symptoms)  Goal: Increased Participation and Engagement (Depressive Signs/Symptoms)  Intervention: Facilitate Participation and Engagement  Recent Flowsheet Documentation  Taken 12/23/2021 1706 by Dallas Snyder RN  Diversional Activity: television     Problem: Mood Impairment (Depressive Signs/Symptoms)  Goal: Improved Mood Symptoms (Depressive Signs/Symptoms)  Intervention: Promote Mood Improvement  Recent Flowsheet Documentation  Taken 12/23/2021 1706 by Dallas Snyder RN  Diversional Activity: television     Problem: Behavioral Health Plan of Care  Goal: Plan of Care Review  Recent Flowsheet Documentation  Taken 12/23/2021 1706 by Dallas Snyder RN  Plan of Care Reviewed With: patient  Patient Agreement with Plan of Care: agrees

## 2021-12-23 NOTE — PROGRESS NOTES
12/23/21 1512   Engagement   Intervention Group   Topic Detail life skills game for self exploration, sharing, socializing and leisure   Attendance Attended   Patient Response Demonstrated understanding of materials provided;Expressed feelings/issues;Expressed positive beliefs about self;Was respectful;Improved mood in response to group;Positive attitude;Prosocial behavior   Concentrated on Task duration of group   Cognition Goal-directed   Mood/Affect Pleasant;Bright   Social/Behavioral Cooperative;Engaged   Goals addressed in session today Pt shared many interests: reading, sewing, knitting, gardening. Pt talked about the importance of family and making social connections. Pt talked about using self compassion cards to help with agueda negative thoughts about herself.

## 2021-12-23 NOTE — PROGRESS NOTES
"PSYCHIATRY  PROGRESS NOTE     DATE OF SERVICE   12/23/2021           CHIEF COMPLAINT   \" I am okay.\"       SUBJECTIVE/OBJECTIVE     Patient remains calm on unit. She has good self cares. She is not revealing delusional thinking and denies hallucinations. She still has very limited insight into mental illness. She agrees to plan for IRTS. She denies side effects to medications.      IRTS referrals are being made         MEDICATIONS   Medications:  Scheduled Meds:    citalopram  10 mg Oral Daily     ziprasidone  40 mg Oral BID w/meals     Continuous Infusions:  PRN Meds:.acetaminophen, alum & mag hydroxide-simethicone, artificial saliva, hydrOXYzine, OLANZapine **OR** OLANZapine, traZODone    Medication adherence issues: MS Med Adherence Y/N: Yes, Hospitalization  Medication side effects: MEDICATION SIDE EFFECTS: no side effects reported  Benefit: Yes / No: Yes       ROS   A comprehensive review of systems was negative.  Lip numbness/discomfort  Review of Systems is otherwise negative including HEENT, CV, Respiratory, GI, , Musculoskeletal, Neurologic, Dermatologic, Endocrine, Immunological, Constitutional systems       MENTAL STATUS EXAM   Vitals: /68 (BP Location: Left arm)   Pulse 63   Temp 98.2  F (36.8  C) (Oral)   Resp 16   Ht 1.651 m (5' 5\")   Wt 57.6 kg (127 lb 0.6 oz)   SpO2 99%   BMI 21.14 kg/m      Appearance:  No apparent distress  Mood: \"I am feeling better\"  Affect: Calm and pleasant  Suicidal Ideation: PRESENT / ABSENT: absent   Homicidal Ideation: PRESENT / ABSENT: absent   Thought process: Linear and goal-directed  Thought content: No delusions or psychotic symptoms have been elicited, but can be guarded and lacks insight  Fund of Knowledge: Average  Attention/Concentration: Normal  Language ability:  Intact  Memory:  Immediate recall intact, Short-term memory intact and Long-term memory intact  Insight: Improving  Judgement: fair  Orientation: Yes, x4  Psychomotor Behavior: normal or " unremarkable    Muscle Strength and Tone: MuscleStrength: Normal  Gait and Station: Normal    Minimal change in mental status in the past 24 hours       LABS   personally reviewed.     No results found for: PHENYTOIN, PHENOBARB, VALPROATE, CBMZ       DIAGNOSIS   Principal Problem:    Acute exacerbation of chronic paranoid schizophrenia (H)    Active Problem List:  Patient Active Problem List   Diagnosis     Psychosis (H)     Acute exacerbation of chronic paranoid schizophrenia (H)          PLAN   1. Ongoing education given regarding diagnostic and treatment options with risks, benefits and alternatives and adequate verbalization of understanding.  2.  Medications:       Geodon  40 mg 2 times a day    citalopram  10 mg Oral Daily     ziprasidone  40 mg Oral BID w/meals       3.  Medical team to follow-up as needed  4.   coordinating a safe discharge plan with family, anticipate IRTS      Risk Assessment: [facility] RISK ASSESSMENT: Patient able to contract for safety    Coordination of Care:   Treatment Plan reviewed and physician signed, Care discussed with Care/Treatment Team Members, Chart reviewed and Patient seen    No further change in treatment plan  Patient seen, chart reviewed, case reviewed with  and with nursing.     Re-Certification I certify that the inpatient psychiatric facility services furnished since the previous certification were, and continue to be, medically necessary for, either, treatment which could reasonably be expected to improve the patient s condition or diagnostic study and that the hospital records indicate that the services furnished were, either, intensive treatment services, admission and related services necessary for diagnostic study, or equivalent services.     I certify that the patient continues to need, on a daily basis, active treatment furnished directly by or requiring the supervision of inpatient psychiatric facility personnel.   I estimate 10 days  of hospitalization is necessary for proper treatment of the patient. My plans for post-hospital care for this patient are  IRTS facility      Poncho Nails MD

## 2021-12-23 NOTE — PROGRESS NOTES
12/23/21 1400   Engagement   Intervention Group   Topic Detail Process group: Coping skills   Attendance Attended   Patient Response Demonstrated understanding of materials provided   Concentrated on Task duration of group   Cognition Goal-directed   Mood/Affect Pleasant   Social/Behavioral Cooperative;Engaged     GROUP LENGTH (MINS):   50 m   RELEVANT PRIMARY DIAGNOSIS: Patient Active Problem List   Diagnosis     Psychosis (H)     Acute exacerbation of chronic paranoid schizophrenia (H)        TREATMENT MODALITY:      [x]CBT       []DBT       []ACT       []Interpersonal psychotherapy                                 []Psychoeducational therapy       []Skill development       []Other:        ATTENDANCE:        [x]Stayed for entire group     []Arrived late    [] Left early       # OF PATIENTS IN GROUP: 4   BILLABLE:     [x]Yes            []No   Notes:

## 2021-12-23 NOTE — PLAN OF CARE
Assessment/Intervention, Care Coordination and Current Symptoms:   Writer met with patient to discuss discharge plans. Patient stated that she was in a good mood today. Patient exhibited humor as she joked with staff. Patient remains in agreement with an IRTS placement and will wait for interview on Monday. Patient states that she is interested in the [facility] due to its proximity to transportation and other amenities.     Writer provided 3 packets of information regarding self compassion, self forgiveness and self empowerment. Patient will read packets and determine a perspective she would like more information on.    Discharge Plan or Goal:  IRTS with case management, psychiatry, and psychotherapy     Barriers to Discharge:  symptom stabilization, medication management, coordination of care    Referral Status:   12/21/21 - Patient was scheduled for a phone assessment with [employer]. On Monday, December 27 at  9:00am     [place] application Approved on 12/16  MA Number # [id number]  SNAP Application completed sister checking e mails    [facility]  referral received by [facility] [phone].    pending- [facility] has verified receiving referral. Arthur has indicated the availability at the [place] location may become available first. All three locations are close to public transit  Fax  Writer spoke to admission counselor. Remains a couple weeks out, due to Holidays. When they are ready a contact will be calling from one of the three locations.       Legal Status: Voluntary      Lenora Huang [facility], 12/23/2021, 10:21 AM

## 2021-12-23 NOTE — PLAN OF CARE
Problem: Behavioral Health Plan of Care  Goal: Plan of Care Review  Outcome: No Change     Problem: Behavioral Health Plan of Care  Goal: Adheres to Safety Considerations for Self and Others  Outcome: No Change  Intervention: Develop and Maintain Individualized Safety Plan  Recent Flowsheet Documentation  Taken 12/23/2021 0921 by Karla Leon RN  Safety Measures:    safety rounds completed    suicide assessment completed     Problem: Mood Impairment (Depressive Signs/Symptoms)  Goal: Improved Mood Symptoms (Depressive Signs/Symptoms)  Outcome: No Change      Pt is alert and oriented. Pleasant and cooperative with care. She is social and appropriate with peers and staff. Attends groups. No behavioral concerns. Med compliant. Though process is organized with no delusions elicited. Pt denies all psych symptoms including anxiety or depression. She denies SI/HI.

## 2021-12-23 NOTE — PLAN OF CARE
Problem: Behavioral Health Plan of Care  Goal: Adheres to Safety Considerations for Self and Others  Intervention: Develop and Maintain Individualized Safety Plan  Recent Flowsheet Documentation  Taken 12/23/2021 0207 by Karrie Pham, RN  Safety Measures:   environmental rounds completed   safety rounds completed     Problem: Sleep Disturbance (Anxiety Signs/Symptoms)  Goal: Improved Sleep (Anxiety Signs/Symptoms)  Outcome: Improving    Patient slept through the night. Safety checks completed per unit policy. No concerns noted during this shift. She had greater than 6 hrs of sleep.

## 2021-12-23 NOTE — PROGRESS NOTES
12/23/21 1132   Engagement   Intervention Group   Topic Detail soap making and ed on essential oils for sequencing, making choices, leisure   Attendance Attended   Patient Response Demonstrated understanding of materials provided;Expressed feelings/issues;Improved mood in response to group   Concentrated on Task duration of group   Cognition Attends to detail;Goal-directed   Mood/Affect Pleasant;Bright   Social/Behavioral Cooperative;Engaged   Goals addressed in session today Pt made soap for her sister. Pt ID'd her alexandro as a barrier to her family connections. Pt hopeful about her interview on monday.

## 2021-12-23 NOTE — PROGRESS NOTES
CLINICAL NUTRITION SERVICES - REASSESSMENT NOTE      RECOMMENDATIONS FOR MD/PROVIDER TO ORDER: none   Recommendations Ordered by Registered Dietitian (RD): none   Future/Additional Recommendations: none   Malnutrition: none       EVALUATION OF PROGRESS TOWARD GOALS   Diet:  Regular with 2 snacks daily    Intake/Tolerance:  Eating 100% of reasonably-sized meals consistently    NEW FINDINGS:   Weight 12/9 125#  Weight 12/21 127#  #    Previous Goals:   Maintain Weight  Intake > 75% of meals  Evaluation: Met    Previous Nutrition Diagnosis:   none      Malnutrition Diagnosis: none      CURRENT NUTRITION DIAGNOSIS - none    INTERVENTIONS  Recommendations / Nutrition Prescription  Continue regular diet and snacks      MONITORING AND EVALUATION:  RD will sign off.  Consult prn.

## 2021-12-24 PROCEDURE — 128N000001 HC R&B CD/MH ADULT

## 2021-12-24 PROCEDURE — 99231 SBSQ HOSP IP/OBS SF/LOW 25: CPT | Performed by: PSYCHIATRY & NEUROLOGY

## 2021-12-24 PROCEDURE — 250N000013 HC RX MED GY IP 250 OP 250 PS 637: Performed by: PSYCHIATRY & NEUROLOGY

## 2021-12-24 PROCEDURE — 90853 GROUP PSYCHOTHERAPY: CPT

## 2021-12-24 RX ADMIN — CITALOPRAM HYDROBROMIDE 10 MG: 10 TABLET ORAL at 08:51

## 2021-12-24 RX ADMIN — ZIPRASIDONE HCL 40 MG: 40 CAPSULE ORAL at 17:10

## 2021-12-24 RX ADMIN — ZIPRASIDONE HCL 40 MG: 40 CAPSULE ORAL at 08:51

## 2021-12-24 ASSESSMENT — ACTIVITIES OF DAILY LIVING (ADL)
HYGIENE/GROOMING: INDEPENDENT
ORAL_HYGIENE: INDEPENDENT
DRESS: STREET CLOTHES
HYGIENE/GROOMING: HANDWASHING;INDEPENDENT
ORAL_HYGIENE: INDEPENDENT
DRESS: INDEPENDENT

## 2021-12-24 NOTE — PLAN OF CARE
Assessment/Intervention, Care Coordination and Current Symptoms:   Writer met with patient to discuss discharge plans. Patient processed therapeutic materials and was interested in self empowerment. Writer provided some articles and worksheets. Patient stated that her sister and brother in law were able to locate the individuals responsible for the fraud. Patient stated that this brings her relief.Patient processed material from group and appears forward focused on her goals. Patient will interview with [facility] IRTS on Monday    Discharge Plan or Goal:  IRTS with case management, psychiatry, and psychotherapy     Barriers to Discharge:  symptom stabilization, medication management, coordination of care    Referral Status:   12/21/21 - Patient was scheduled for a phone assessment with [employer]. On Monday, December 27 at  9:00am     MA application Approved on 12/16  MA Number # [id number]  SNAP Application completed sister checking e mails  [facility]  referral received by [facility]/approved- 1- 2 weeks for case manger t be assigned. [facility] [phone].    pending- [facility]. Arthur has indicated the availability at the [place] location may become available first. All three locations are close to public transit  Fax  Writer spoke to admission counselor. Remains a couple weeks out, due to Holidays. When they are ready a contact will be calling from one of the three locations.       Legal Status: Voluntary      Lenora Huang [facility], 12/23/2021, 10:21 AM

## 2021-12-24 NOTE — PROGRESS NOTES
12/24/21 1104   Engagement   Intervention Group   Topic Detail OT Creative Expressions group-Ribbon wreaths for creativity, social engagement, symptom management, healthy distraction, and focus   Attendance Attended   Patient Response Demonstrated understanding of materials provided;Expressed feelings/issues;Was respectful;Positive attitude   Concentrated on Task duration of group   Cognition Goal-directed;Attends to detail;Sequences task;Initiates task;Follows through with task   Mood/Affect Pleasant;Content   Social/Behavioral Cooperative;Engaged;Motivated   Goals addressed in session today pt actively engaged in group activity. pt was able to complete ribbon tying during group time. pt was pleasant and appropriate during interactions

## 2021-12-24 NOTE — PROGRESS NOTES
12/24/21 1000   Engagement   Intervention Group   Topic Detail Process group: Confidence   Attendance Attended   Patient Response Demonstrated understanding of materials provided   Concentrated on Task duration of group   Cognition Goal-directed   Mood/Affect Bright;Pleasant   Social/Behavioral Cooperative;Engaged     GROUP LENGTH (MINS):   50 m   RELEVANT PRIMARY DIAGNOSIS: Patient Active Problem List   Diagnosis     Psychosis (H)     Acute exacerbation of chronic paranoid schizophrenia (H)        TREATMENT MODALITY:      []CBT       []DBT       []ACT       []Interpersonal psychotherapy                                 [x]Psychoeducational therapy       []Skill development       []Other:        ATTENDANCE:        [x]Stayed for entire group     []Arrived late    [] Left early       # OF PATIENTS IN GROUP: 3   BILLABLE:     [x]Yes            []No   Notes:

## 2021-12-24 NOTE — PLAN OF CARE
Problem: Behavioral Health Plan of Care  Goal: Adheres to Safety Considerations for Self and Others  Intervention: Develop and Maintain Individualized Safety Plan  Recent Flowsheet Documentation  Taken 12/24/2021 3665 by Karrie Pham, RN  Safety Measures:    environmental rounds completed    safety rounds completed     Problem: Sleep Disturbance  Goal: Adequate Sleep/Rest  Outcome: Improving      Patient slept for most part of the night. No suicide behaviors noted. Safety checks completed Q 15 min's per protocol. She had more than 6 hrs of sleep.

## 2021-12-24 NOTE — PROGRESS NOTES
"PSYCHIATRY  PROGRESS NOTE     DATE OF SERVICE   12/24/2021           CHIEF COMPLAINT   \" I am okay.\"       SUBJECTIVE/OBJECTIVE     Patient remains calm on unit. She has good self cares. She is not revealing delusional thinking and denies hallucinations. She still has very limited insight into mental illness. She agrees to plan for IRTS. She denies side effects to medications. Minimal change in mental status in the past 24 hours        IRTS referrals are being made         MEDICATIONS   Medications:  Scheduled Meds:    citalopram  10 mg Oral Daily     ziprasidone  40 mg Oral BID w/meals     Continuous Infusions:  PRN Meds:.acetaminophen, alum & mag hydroxide-simethicone, artificial saliva, hydrOXYzine, OLANZapine **OR** OLANZapine, traZODone    Medication adherence issues: MS Med Adherence Y/N: Yes, Hospitalization  Medication side effects: MEDICATION SIDE EFFECTS: no side effects reported  Benefit: Yes / No: Yes       ROS   A comprehensive review of systems was negative.  Lip numbness/discomfort with some improvement    Review of Systems is otherwise negative including HEENT, CV, Respiratory, GI, , Musculoskeletal, Neurologic, Dermatologic, Endocrine, Immunological, Constitutional systems         MENTAL STATUS EXAM   Vitals: /67 (BP Location: Right arm)   Pulse 68   Temp 97.9  F (36.6  C) (Oral)   Resp 17   Ht 1.651 m (5' 5\")   Wt 57.6 kg (127 lb 0.6 oz)   SpO2 98%   BMI 21.14 kg/m      Appearance:  No apparent distress  Mood: Euthymic  Affect: mood congruent  Suicidal Ideation: PRESENT / ABSENT: absent   Homicidal Ideation: PRESENT / ABSENT: absent   Thought process: Linear and goal-directed  Thought content: No delusions or psychotic symptoms observed  Fund of Knowledge: Average  Attention/Concentration: Normal  Language ability:  Intact  Memory:  Immediate recall intact, Short-term memory intact and Long-term memory intact  Insight: Improving  Judgement: improving  Orientation: Yes, " x4  Psychomotor Behavior: normal or unremarkable    Muscle Strength and Tone: MuscleStrength: Normal  Gait and Station: Normal    Minimal change in mental status in the past 24 hours         LABS   personally reviewed.     No results found for: PHENYTOIN, PHENOBARB, VALPROATE, CBMZ       DIAGNOSIS   Principal Problem:    Acute exacerbation of chronic paranoid schizophrenia (H)    Active Problem List:  Patient Active Problem List   Diagnosis     Psychosis (H)     Acute exacerbation of chronic paranoid schizophrenia (H)          PLAN   1. Ongoing education given regarding diagnostic and treatment options with risks, benefits and alternatives and adequate verbalization of understanding.  2.  Medications:       Geodon  40 mg 2 times a day    citalopram  10 mg Oral Daily     ziprasidone  40 mg Oral BID w/meals       3.  Medical team to follow-up as needed  4.   coordinating a safe discharge plan with family, anticipate IRTS      Risk Assessment: [facility] RISK ASSESSMENT: Patient able to contract for safety    Coordination of Care:   Treatment Plan reviewed and physician signed, Care discussed with Care/Treatment Team Members, Chart reviewed and Patient seen    No further change in treatment plan  Patient seen, chart reviewed, case reviewed with  and with nursing.       Re-Certification I certify that the inpatient psychiatric facility services furnished since the previous certification were, and continue to be, medically necessary for, either, treatment which could reasonably be expected to improve the patient s condition or diagnostic study and that the hospital records indicate that the services furnished were, either, intensive treatment services, admission and related services necessary for diagnostic study, or equivalent services.     I certify that the patient continues to need, on a daily basis, active treatment furnished directly by or requiring the supervision of inpatient psychiatric  facility personnel.   I estimate 10 days of hospitalization is necessary for proper treatment of the patient. My plans for post-hospital care for this patient are  IRTS facility      Poncho Nails MD

## 2021-12-24 NOTE — PLAN OF CARE
"Patient states \"I'm having a great day\" no complaints of pain, anxiety or depression  Intake is adequate and has enjoyed interaction with peers appropriately and reading her self help books. She denies SI/HI, contracts for safety and noted no signs or conversation that entailed hallucinations/delusions  Problem: Activity and Energy Impairment (Anxiety Signs/Symptoms)  Goal: Optimized Energy Level (Anxiety Signs/Symptoms)  Outcome: Improving  Intervention: Optimize Energy Level  Recent Flowsheet Documentation  Taken 12/24/2021 0900 by Scott Nath RN  Patient Performed Hygiene: shower  Activity (Behavioral Health): activity encouraged     Problem: Cognitive Impairment (Anxiety Signs/Symptoms)  Goal: Optimized Cognitive Function (Anxiety Signs/Symptoms)  Outcome: Improving     Problem: Mood Impairment (Anxiety Signs/Symptoms)  Goal: Improved Mood Symptoms (Anxiety Signs/Symptoms)  Outcome: Improving  Intervention: Optimize Emotion and Mood  Recent Flowsheet Documentation  Taken 12/24/2021 0900 by Scott Nath, RN  Supportive Measures: active listening utilized     "

## 2021-12-25 PROCEDURE — 128N000001 HC R&B CD/MH ADULT

## 2021-12-25 PROCEDURE — 250N000013 HC RX MED GY IP 250 OP 250 PS 637: Performed by: PSYCHIATRY & NEUROLOGY

## 2021-12-25 RX ADMIN — ZIPRASIDONE HCL 40 MG: 40 CAPSULE ORAL at 17:11

## 2021-12-25 RX ADMIN — CITALOPRAM HYDROBROMIDE 10 MG: 10 TABLET ORAL at 08:09

## 2021-12-25 RX ADMIN — ZIPRASIDONE HCL 40 MG: 40 CAPSULE ORAL at 08:09

## 2021-12-25 ASSESSMENT — MIFFLIN-ST. JEOR: SCORE: 1187.37

## 2021-12-25 ASSESSMENT — ACTIVITIES OF DAILY LIVING (ADL)
HYGIENE/GROOMING: HANDWASHING;SHOWER;INDEPENDENT
HYGIENE/GROOMING: INDEPENDENT
DRESS: STREET CLOTHES
DRESS: INDEPENDENT
ORAL_HYGIENE: INDEPENDENT
ORAL_HYGIENE: INDEPENDENT

## 2021-12-25 NOTE — PLAN OF CARE
Problem: Cognitive Impairment (Anxiety Signs/Symptoms)  Goal: Optimized Cognitive Function (Anxiety Signs/Symptoms)  Outcome: Improving     Problem: Mood Impairment (Anxiety Signs/Symptoms)  Goal: Improved Mood Symptoms (Anxiety Signs/Symptoms)  Outcome: Improving     1800:  Patient's mood/affect is calm and pleasant. She is out in the milieu watching TV and interacting with peers. Patient is cooperative, polite, and respectful of staff and peers. She has eaten 100% of dinner and answered all assessment questions. She denies all psych related symptoms and contracted for safety. She is compliant with medications and ADLs. Will continue to provide care.

## 2021-12-25 NOTE — PROGRESS NOTES
12/25/21 1149   Engagement   Intervention Group   Topic Detail Season-orienting activity (snow man candy gifts) for healthy leisure, creative expression, improving feelings of self-worth, and coping with symptoms through distraction.   Attendance Attended   Patient Response Demonstrated understanding of materials provided;Prosocial behavior;Improved mood in response to group   Concentrated on Task duration of group   Cognition Goal-directed   Mood/Affect Content   Social/Behavioral Cooperative;Engaged   Thought Content Reality oriented

## 2021-12-25 NOTE — PLAN OF CARE
Patient denies depression and anxiety and outwardly appears to have stable mood and conversation is void of delusional content. Patient also denies SI/Hi and interactions with peers and staff are appropriate. Patient has denied pain and has remained safe on unit and contracts for continued safety. She has kept her self busy today, journal ing and reading and attendance at group, she remains med compliant  Problem: Activity and Energy Impairment (Anxiety Signs/Symptoms)  Goal: Optimized Energy Level (Anxiety Signs/Symptoms)  Outcome: Improving  Flowsheets (Taken 12/25/2021 1152)  Mutually Determined Action Steps (Optimized Energy Level):   grooms self without prompting   dresses/ready for morning activity  Intervention: Optimize Energy Level  Recent Flowsheet Documentation  Taken 12/25/2021 1137 by Scott Nath RN  Patient Performed Hygiene:   shampoo   shower   teeth brushed  Diversional Activity:   journaling   reading  Activity (Behavioral Health): activity encouraged     Problem: Cognitive Impairment (Anxiety Signs/Symptoms)  Goal: Optimized Cognitive Function (Anxiety Signs/Symptoms)  Outcome: Improving  Flowsheets (Taken 12/25/2021 1152)  Mutually Determined Action Steps (Optimized Cognitive Function): participates in one-to-one sessions     Problem: Mood Impairment (Anxiety Signs/Symptoms)  Goal: Improved Mood Symptoms (Anxiety Signs/Symptoms)  Outcome: Improving  Flowsheets (Taken 12/25/2021 1152)  Mutually Determined Action Steps (Improved Mood Symptoms):   adheres to medication regimen   shares insight re: need for meds

## 2021-12-25 NOTE — PROVIDER NOTIFICATION
GROUP LENGTH (MINS):   35 minutes   RELEVANT PRIMARY DIAGNOSIS: Patient Active Problem List   Diagnosis     Psychosis (H)     Acute exacerbation of chronic paranoid schizophrenia (H)        TREATMENT MODALITY:      []CBT       []DBT       []ACT       []Interpersonal psychotherapy                                 []Psychoeducational therapy       [x]Skill development       []Other:        ATTENDANCE:        []Stayed for entire group     []Arrived late    [] Left early       # OF PATIENTS IN GROUP:    BILLABLE:     []Yes            [x]No   Notes:           12/25/21 1149   Engagement   Intervention Group   Topic Detail Season-orienting activity (snow man candy gifts) for healthy leisure, creative expression, improving feelings of self-worth, and coping with symptoms through distraction.   Attendance Attended   Patient Response Demonstrated understanding of materials provided;Prosocial behavior;Improved mood in response to group   Concentrated on Task duration of group   Cognition Goal-directed   Mood/Affect Content   Social/Behavioral Cooperative;Engaged   Thought Content Reality oriented

## 2021-12-25 NOTE — PLAN OF CARE
Problem: Sleep Disturbance (Depressive Signs/Symptoms)  Goal: Improved Sleep (Depressive Signs/Symptoms)  Outcome: Improving     Problem: Behavioral Health Plan of Care  Goal: Absence of New-Onset Illness or Injury  Outcome: Improving     Problem: Sleep Disturbance  Goal: Adequate Sleep/Rest  Outcome: Improving     Patient passed the night uneventfully, slept through the night without requesting for prn medications either for pain, sleep or anxiety. Patient was cooperative with safety checks during which time patient was observed sleeping with even, unlabored respirations. Patient manifests no untoward behaviors, endorses no SI/HI, A/VH or SIB. No evidence of withdrawal or distress. Overall, patient achieves greater than 7 hours of good quality sleep. Will continue with needed cares.    Rl Cano DNP, RN, APRN, CNS, AGCNS-BC.

## 2021-12-26 PROCEDURE — 128N000001 HC R&B CD/MH ADULT

## 2021-12-26 PROCEDURE — 250N000013 HC RX MED GY IP 250 OP 250 PS 637: Performed by: PSYCHIATRY & NEUROLOGY

## 2021-12-26 RX ADMIN — ZIPRASIDONE HCL 40 MG: 40 CAPSULE ORAL at 08:21

## 2021-12-26 RX ADMIN — ZIPRASIDONE HCL 40 MG: 40 CAPSULE ORAL at 17:36

## 2021-12-26 RX ADMIN — CITALOPRAM HYDROBROMIDE 10 MG: 10 TABLET ORAL at 08:21

## 2021-12-26 ASSESSMENT — ACTIVITIES OF DAILY LIVING (ADL)
ORAL_HYGIENE: INDEPENDENT
HYGIENE/GROOMING: HANDWASHING;SHOWER;INDEPENDENT
HYGIENE/GROOMING: INDEPENDENT
DRESS: STREET CLOTHES
DRESS: INDEPENDENT
ORAL_HYGIENE: INDEPENDENT

## 2021-12-26 NOTE — PLAN OF CARE
"Patient denies depression and anxiety and pain and has completed adl's independently, looks forward to discharge admits to \"boredom\" Denies SI/HI and contracts for safety. I have noted no delusional content in her conversations  Problem: Behavioral Health Plan of Care  Goal: Plan of Care Review  Outcome: Improving  Flowsheets  Taken 12/26/2021 1205  Plan of Care Reviewed With: patient  Progress: improving  Patient Agreement with Plan of Care: agrees  Taken 12/26/2021 1100  Plan of Care Reviewed With: patient  Patient Agreement with Plan of Care: agrees  Consent Given to Review Plan with: sister     Problem: Behavioral Health Plan of Care  Goal: Adheres to Safety Considerations for Self and Others  Outcome: Improving  Intervention: Develop and Maintain Individualized Safety Plan  Recent Flowsheet Documentation  Taken 12/26/2021 1100 by Scott Nath, RN  Safety Measures: environmental rounds completed     Problem: Behavioral Health Plan of Care  Goal: Absence of New-Onset Illness or Injury  Outcome: Improving  Intervention: Identify and Manage Fall Risk  Recent Flowsheet Documentation  Taken 12/26/2021 1100 by Scott Nath, RN  Safety Measures: environmental rounds completed     "

## 2021-12-26 NOTE — PROGRESS NOTES
Pt sleeps all of the night. She denies all psych symptoms. There has been no evidence of pain. Q 15 minutes safety checks is ongoing. There has been no behavior problem. Pt is still sleeping.

## 2021-12-26 NOTE — PLAN OF CARE
"  Problem: Social, Occupational or Functional Impairment (Depressive Signs/Symptoms)  Goal: Enhanced Social, Occupational or Functional Skills (Depressive Signs/Symptoms)  Outcome: No Change  Intervention: Promote Social, Occupational and Functional Ability  Recent Flowsheet Documentation  Taken 12/25/2021 8333 by José Luis Jensen RN  Trust Relationship/Rapport:    emotional support provided    empathic listening provided    questions answered    questions encouraged  Patient is sociable and interactive with peers and staff. Her affect/mood is calm and pleasant. She independently and  freely verbalizes her concerns and is compliant with medications and ADLs. She denies pain, anxiety, depression, SI, HI, and SIB. Patient is concern about her medication regimen. She states that her Geodon is more effective and wants  the psychiatrist to discontinue Celexa. Patient is advised to address medications concerns with the psychiatrist on Monday.  No psychosis or maniac behavior noted and patient is cooperative and exited with the cares she receives. She tells the writer \"I feel find. I have never felt this good before. I have been on Celexa for a longtime, but thinks Geodon helps me more. I like the drug.\" Patient's appetite is good, vitals are stable, and patient is alert and oriented x4      (see flow sheet for details).Staff will continue to provide evidence based care as required.  "

## 2021-12-27 LAB — SARS-COV-2 RNA RESP QL NAA+PROBE: NEGATIVE

## 2021-12-27 PROCEDURE — 250N000013 HC RX MED GY IP 250 OP 250 PS 637: Performed by: PSYCHIATRY & NEUROLOGY

## 2021-12-27 PROCEDURE — 99232 SBSQ HOSP IP/OBS MODERATE 35: CPT | Performed by: PSYCHIATRY & NEUROLOGY

## 2021-12-27 PROCEDURE — 87635 SARS-COV-2 COVID-19 AMP PRB: CPT | Performed by: PSYCHIATRY & NEUROLOGY

## 2021-12-27 PROCEDURE — 128N000001 HC R&B CD/MH ADULT

## 2021-12-27 RX ORDER — CITALOPRAM HYDROBROMIDE 10 MG/1
10 TABLET ORAL
Status: DISCONTINUED | OUTPATIENT
Start: 2021-12-27 | End: 2021-12-29 | Stop reason: HOSPADM

## 2021-12-27 RX ADMIN — ZIPRASIDONE HCL 40 MG: 40 CAPSULE ORAL at 16:57

## 2021-12-27 RX ADMIN — ZIPRASIDONE HCL 40 MG: 40 CAPSULE ORAL at 08:09

## 2021-12-27 RX ADMIN — CITALOPRAM HYDROBROMIDE 10 MG: 10 TABLET ORAL at 16:57

## 2021-12-27 ASSESSMENT — ACTIVITIES OF DAILY LIVING (ADL)
HYGIENE/GROOMING: HANDWASHING
HYGIENE/GROOMING: HANDWASHING;SHOWER;INDEPENDENT
DRESS: STREET CLOTHES
ORAL_HYGIENE: INDEPENDENT
DRESS: STREET CLOTHES;INDEPENDENT
ORAL_HYGIENE: INDEPENDENT

## 2021-12-27 NOTE — PROGRESS NOTES
[id number]: Pt sleeping at the start of the shift in no apparent distress. Continue on suicide precaution without any related behavior noted.Slept all night.

## 2021-12-27 NOTE — PLAN OF CARE
Assessment/Intervention/Current Symtoms and Care Coordination    Writer consulted with MD and care team. Writer met with patient post IRTS interview today. Patient reports the interview went well and she believes she should be able to admit later this week. Writer called and spoke with Will with [employer] and he reports patient has been accepted to [facility]. Patient can admit on Thursday.   Writer discussed with patient and she is in agreement with plan and appeared happy to learn of her acceptance.       Discharge Plan or Goal: [facility]        Barriers to Discharge: Admission to [facility], coordination of care      Referral Status:   12/21/21 - Patient was scheduled for a phone assessment with [facility]. On Monday, December 27 at  9:00am 12/27: Accepted for admission Thursday 12/30.     MA application Approved on 12/16  MA Number # [id number]  SNAP Application completed sister checking e mails  [facility]  referral received by [facility]/approved- 1- 2 weeks for case manger t be assigned. [facility] [phone].      [facility] has verified receiving referral. [facility] has indicated the availability at the [place] location may become available first. All three locations are close to public transit  Fax  Writer spoke to admission counselor. Remains a couple weeks out, due to Holidays. 12/27: Writer provided update of admission to another program      Legal Status: Voluntary      Alejandrina Nogueira, [facility], 12/27/2021, 3:48 PM

## 2021-12-27 NOTE — PLAN OF CARE
Problem: Activity and Energy Impairment (Anxiety Signs/Symptoms)  Goal: Optimized Energy Level (Anxiety Signs/Symptoms)  Outcome: Improving  Intervention: Optimize Energy Level  Recent Flowsheet Documentation  Taken 12/26/2021 1600 by José Luis Jensen RN  Patient Performed Hygiene: dressed  Diversional Activity: television  Activity (Behavioral Health):   activity encouraged   activity adjusted per tolerance     Problem: Cognitive Impairment (Anxiety Signs/Symptoms)  Goal: Optimized Cognitive Function (Anxiety Signs/Symptoms)  Outcome: Improving     Problem: Behavioral Health Plan of Care  Goal: Optimized Coping Skills in Response to Life Stressors  Outcome: Improving  Goal: Develops/Participates in Therapeutic [place] to Support Successful Transition  Outcome: Improving  Intervention: Foster Therapeutic [place]  Recent Flowsheet Documentation  Taken 12/26/2021 1600 by José Luis Jensen RN  Trust Relationship/Rapport:   emotional support provided   empathic listening provided    No abnormal behaviors observed. Patient is calm and respectful on approach. She spent the evening hours reading in room and isolated for the most. Patient is alert and oriented x4 and is compliant with medications and ADLs. She denied SI, HI, SIB, and other psych symptoms. Staff will continue to provide care.

## 2021-12-27 NOTE — PROGRESS NOTES
12/27/21 1530   Engagement   Intervention Group   Topic Detail marbling tor beads for relaxation, leisure, creativity, sequencing and socializing   Attendance Attended   Patient Response Demonstrated understanding of materials provided;Was respectful;Positive attitude;Improved mood in response to group   Concentrated on Task duration of group   Cognition Goal-directed   Mood/Affect Pleasant;Bright   Social/Behavioral Cooperative;Engaged;Motivated   Goals addressed in session today Pt independent after demo to marble the tor and make beads

## 2021-12-27 NOTE — PROGRESS NOTES
"PSYCHIATRY  PROGRESS NOTE     DATE OF SERVICE   12/27/2021           CHIEF COMPLAINT   \" I am okay.\"       SUBJECTIVE/OBJECTIVE     Patient has been pleasant and cooperative. She denies suicidal or homicidal thoughts and is no longer voicing delusional thinking. She reports some improvement in her lip pain. She would like to switch Celexa to evening due to some sedation. She is agreeable to plan for IRTS placement    IRTS referrals are being made and patient has interview this week with [facility]         MEDICATIONS   Medications:  Scheduled Meds:    citalopram  10 mg Oral Daily with supper     ziprasidone  40 mg Oral BID w/meals     Continuous Infusions:  PRN Meds:.acetaminophen, alum & mag hydroxide-simethicone, artificial saliva, hydrOXYzine, OLANZapine **OR** OLANZapine, traZODone    Medication adherence issues: MS Med Adherence Y/N: Yes, Hospitalization  Medication side effects: MEDICATION SIDE EFFECTS: no side effects reported  Benefit: Yes / No: Yes       ROS   A comprehensive review of systems was negative.  Lip numbness/discomfort with some improvement    Review of Systems is otherwise negative including HEENT, CV, Respiratory, GI, , Musculoskeletal, Neurologic, Dermatologic, Endocrine, Immunological, Constitutional systems         MENTAL STATUS EXAM   Vitals: /62 (BP Location: Left arm)   Pulse 63   Temp 97.4  F (36.3  C) (Oral)   Resp 18   Ht 1.651 m (5' 5\")   Wt 59.1 kg (130 lb 6.4 oz)   SpO2 98%   BMI 21.70 kg/m      Appearance:  No apparent distress  Mood: Euthymic  Affect: mood congruent  Suicidal Ideation: PRESENT / ABSENT: absent   Homicidal Ideation: PRESENT / ABSENT: absent   Thought process: Linear and goal-directed  Thought content: No delusions or psychotic symptoms observed  Fund of Knowledge: Average  Attention/Concentration: Normal  Language ability:  Intact  Memory:  Immediate recall intact, Short-term memory intact and Long-term memory intact  Insight: Improving  Judgement: " improving  Orientation: Yes, x4  Psychomotor Behavior: normal or unremarkable    Muscle Strength and Tone: MuscleStrength: Normal  Gait and Station: Normal    Minimal change in mental status in the past 72 hours       LABS   personally reviewed.     No results found for: PHENYTOIN, PHENOBARB, VALPROATE, CBMZ       DIAGNOSIS   Principal Problem:    Acute exacerbation of chronic paranoid schizophrenia (H)    Active Problem List:  Patient Active Problem List   Diagnosis     Psychosis (H)     Acute exacerbation of chronic paranoid schizophrenia (H)          PLAN   1. Ongoing education given regarding diagnostic and treatment options with risks, benefits and alternatives and adequate verbalization of understanding.  2.  Medications:      citalopram  10 mg Oral Daily with supper     ziprasidone  40 mg Oral BID w/meals       3.  Medical team to follow-up as needed  4.   coordinating a safe discharge plan with family, anticipate IRTS      Risk Assessment:  [facility] RISK ASSESSMENT: Patient able to contract for safety    Coordination of Care:   Treatment Plan reviewed and physician signed, Care discussed with Care/Treatment Team Members, Chart reviewed and Patient seen    No further change in treatment plan  Patient seen, chart reviewed, case reviewed with  and with nursing.   Case reviewed in multi-disciplinary treatment team.  More than 25 minutes spent on this visit with more than 50% time spent on coordination of care with staff, reviewing medical record, psychoeducation, providing supportive therapy regarding coping with chronic mental illness, entering orders and preparing documentation for the visit      Re-Certification I certify that the inpatient psychiatric facility services furnished since the previous certification were, and continue to be, medically necessary for, either, treatment which could reasonably be expected to improve the patient s condition or diagnostic study and that the  hospital records indicate that the services furnished were, either, intensive treatment services, admission and related services necessary for diagnostic study, or equivalent services.     I certify that the patient continues to need, on a daily basis, active treatment furnished directly by or requiring the supervision of inpatient psychiatric facility personnel.   I estimate 7 days of hospitalization is necessary for proper treatment of the patient. My plans for post-hospital care for this patient are  [facility] facility      Poncho Nails MD

## 2021-12-27 NOTE — PROGRESS NOTES
12/27/21 1110   Engagement   Intervention Group   Topic Detail exercise dice for wellbeing, coping, leisure, and socializing   Attendance Attended   Patient Response Demonstrated understanding of materials provided;Was respectful;Expressed positive beliefs about self;Improved mood in response to group;Expressed hope;Expressed feelings/issues   Concentrated on Task duration of group   Cognition Goal-directed   Mood/Affect Pleasant;Bright   Social/Behavioral Cooperative;Engaged   Goals addressed in session today Pt expressed gratitude for her family. Pt wants to go to the gym after d/c.

## 2021-12-27 NOTE — PLAN OF CARE
Problem: Coping with Symptoms  Goal: Practice Coping Skills  Description: Pt will practice using 4 coping strategies to manage stress and reduce symptoms this review period.   Outcome: Improving   Goal review completed:     Patient met with OT to discuss progress with using coping skills. Pt plans to continue with reading info on self compassion and use her weighted blanket, along with several other strategies. Pt is expecting to discharge by the end of the week to an IRTS. Pt future focused with goals as she wants to get back to work and get her own place.    Care plan goals have been reviewed. Continue care plan and interventions for further progress. Patient has made progress towards goal. Care plan updated to reflect changes. Continue to correspond with interdisciplinary team regarding ongoing treatment plan, potential changes in patient's status, and discharge planning.

## 2021-12-27 NOTE — PLAN OF CARE
Patient denies SI/HI and admits to mild anxiety 3/10, d/t interview with IRTS facility today. She was encouraged to be honest with needs. She denies depression and exhibits independence with adls', remains med compliant and d/t changing of scheduled she has achieved 8+ hours of sleep nightly. She is able to contract for safety while on the unit  Problem: Mood Impairment (Anxiety Signs/Symptoms)  Goal: Improved Mood Symptoms (Anxiety Signs/Symptoms)  Outcome: Improving  Flowsheets (Taken 12/27/2021 1246)  Mutually Determined Action Steps (Improved Mood Symptoms):    adheres to medication regimen    shares insight re: need for meds     Problem: Behavioral Health Plan of Care  Goal: Plan of Care Review  Outcome: Improving  Flowsheets  Taken 12/27/2021 1246 by Scott Nath RN  Plan of Care Reviewed With: patient  Progress: improving  Taken 12/26/2021 1600 by José Luis Jensen RN  Patient Agreement with Plan of Care: agrees  Taken 12/26/2021 1100 by Scott Nath RN  Consent Given to Review Plan with: sister     Problem: Sleep Disturbance (Depressive Signs/Symptoms)  Goal: Improved Sleep (Depressive Signs/Symptoms)  Recent Flowsheet Documentation  Taken 12/27/2021 1246 by Scott Nath RN  Mutually Determined Action Steps (Improved Sleep):    sleeps 4-6 hours at night    remains out of bed during day

## 2021-12-27 NOTE — PLAN OF CARE
BEHAVIORAL TEAM DISCUSSION    Participants: MD: Dr. Nails, SW: Alejandrina WEBSTER RN: Scott ECHOLS, Pharm: Saul ECHOLS OT: Elmira REA  Progress: Progressing  Anticipated length of stay: 12/30  Continued Stay Criteria/Rationale: Symptom stabilization, discharge planning  Medical/Physical: None reported  Precautions:   Behavioral Orders   Procedures    [place] Precautions (behavioral units)    Code 1 - Restrict to Unit    Routine Programming     As clinically indicated    Status 15     Every 15 minutes.    Suicide precautions     Patients on Suicide Precautions should have a Combination Diet ordered that includes a Diet selection(s) AND a Behavioral Tray selection for Safe Tray - with utensils, or Safe Tray - NO utensils       Plan: [facility] IRTS  Rationale for change in precautions or plan: None reported

## 2021-12-27 NOTE — PLAN OF CARE
Excited about discharge planning for Thursday. Pleasant, cooperative and compliant with medications. Visible in milieu, social and engaged, observed coloring and watching tv. Denied depression or anxiety, no SI/HI, contracted for safety. Denied pain or discomfort.   No delusional thinking, optimistic.  Spent time watching tv, coloring and interacting with peers.  Problem: Behavioral Health Plan of Care  Goal: Adheres to Safety Considerations for Self and Others  Outcome: Improving  Intervention: Develop and Maintain Individualized Safety Plan  Recent Flowsheet Documentation  Taken 12/27/2021 1630 by Dallas Snyder RN  Safety Measures:    environmental rounds completed    safety rounds completed  Goal: Absence of New-Onset Illness or Injury  Outcome: Improving  Intervention: Identify and Manage Fall Risk  Recent Flowsheet Documentation  Taken 12/27/2021 1630 by Dallas Snyder RN  Safety Measures:    environmental rounds completed    safety rounds completed  Goal: Optimized Coping Skills in Response to Life Stressors  Outcome: Improving  Goal: Develops/Participates in Therapeutic [place] to Support Successful Transition  Outcome: Improving     Problem: Activity and Energy Impairment (Anxiety Signs/Symptoms)  Goal: Optimized Energy Level (Anxiety Signs/Symptoms)  Intervention: Optimize Energy Level  Recent Flowsheet Documentation  Taken 12/27/2021 1630 by Dallas Snyder RN  Patient Performed Hygiene: teeth brushed  Diversional Activity:    television    reading  Activity (Behavioral Health): activity encouraged     Problem: Activity and Energy Impairment (Depressive Signs/Symptoms)  Goal: Optimized Energy Level (Depressive Signs/Symptoms)  Intervention: Optimize Energy Level  Recent Flowsheet Documentation  Taken 12/27/2021 1630 by Dallas Snyder, RN  Patient Performed Hygiene: teeth brushed  Diversional Activity:    television    reading  Activity (Behavioral Health): activity encouraged      Problem: Decreased Participation and Engagement (Depressive Signs/Symptoms)  Goal: Increased Participation and Engagement (Depressive Signs/Symptoms)  Intervention: Facilitate Participation and Engagement  Recent Flowsheet Documentation  Taken 12/27/2021 1630 by Dallas Snyder, RN  Diversional Activity:    television    reading     Problem: Mood Impairment (Depressive Signs/Symptoms)  Goal: Improved Mood Symptoms (Depressive Signs/Symptoms)  Intervention: Promote Mood Improvement  Recent Flowsheet Documentation  Taken 12/27/2021 1630 by Dallas Snyder, RN  Diversional Activity:    television    reading     Problem: Behavioral Health Plan of Care  Goal: Plan of Care Review  Recent Flowsheet Documentation  Taken 12/27/2021 1630 by Dallas Snyder, RN  Plan of Care Reviewed With: patient  Patient Agreement with Plan of Care: agrees

## 2021-12-28 PROCEDURE — 250N000013 HC RX MED GY IP 250 OP 250 PS 637: Performed by: PSYCHIATRY & NEUROLOGY

## 2021-12-28 PROCEDURE — 99231 SBSQ HOSP IP/OBS SF/LOW 25: CPT | Performed by: PSYCHIATRY & NEUROLOGY

## 2021-12-28 PROCEDURE — 128N000001 HC R&B CD/MH ADULT

## 2021-12-28 RX ORDER — OLANZAPINE 10 MG/2ML
10 INJECTION, POWDER, FOR SOLUTION INTRAMUSCULAR 3 TIMES DAILY PRN
Status: CANCELLED | OUTPATIENT
Start: 2021-12-28

## 2021-12-28 RX ORDER — OLANZAPINE 10 MG/1
10 TABLET ORAL 3 TIMES DAILY PRN
Status: CANCELLED | OUTPATIENT
Start: 2021-12-28

## 2021-12-28 RX ORDER — TRAZODONE HYDROCHLORIDE 50 MG/1
50 TABLET, FILM COATED ORAL
Status: CANCELLED | OUTPATIENT
Start: 2021-12-28

## 2021-12-28 RX ORDER — ZIPRASIDONE HYDROCHLORIDE 40 MG/1
40 CAPSULE ORAL 2 TIMES DAILY WITH MEALS
Status: CANCELLED | OUTPATIENT
Start: 2021-12-28

## 2021-12-28 RX ORDER — SALIVA STIMULANT COMB. NO.3
2 SPRAY, NON-AEROSOL (ML) MUCOUS MEMBRANE 4 TIMES DAILY PRN
Status: CANCELLED | OUTPATIENT
Start: 2021-12-28

## 2021-12-28 RX ORDER — MAGNESIUM HYDROXIDE/ALUMINUM HYDROXICE/SIMETHICONE 120; 1200; 1200 MG/30ML; MG/30ML; MG/30ML
30 SUSPENSION ORAL EVERY 4 HOURS PRN
Status: CANCELLED | OUTPATIENT
Start: 2021-12-28

## 2021-12-28 RX ORDER — HYDROXYZINE HYDROCHLORIDE 25 MG/1
25 TABLET, FILM COATED ORAL EVERY 4 HOURS PRN
Status: CANCELLED | OUTPATIENT
Start: 2021-12-28

## 2021-12-28 RX ORDER — CITALOPRAM HYDROBROMIDE 10 MG/1
10 TABLET ORAL
Status: CANCELLED | OUTPATIENT
Start: 2021-12-28

## 2021-12-28 RX ORDER — ACETAMINOPHEN 325 MG/1
650 TABLET ORAL EVERY 4 HOURS PRN
Status: CANCELLED | OUTPATIENT
Start: 2021-12-28

## 2021-12-28 RX ADMIN — ZIPRASIDONE HCL 40 MG: 40 CAPSULE ORAL at 17:16

## 2021-12-28 RX ADMIN — CITALOPRAM HYDROBROMIDE 10 MG: 10 TABLET ORAL at 17:15

## 2021-12-28 RX ADMIN — ZIPRASIDONE HCL 40 MG: 40 CAPSULE ORAL at 08:11

## 2021-12-28 ASSESSMENT — ACTIVITIES OF DAILY LIVING (ADL)
ORAL_HYGIENE: INDEPENDENT
DRESS: STREET CLOTHES
HYGIENE/GROOMING: INDEPENDENT

## 2021-12-28 ASSESSMENT — MIFFLIN-ST. JEOR: SCORE: 1169.23

## 2021-12-28 NOTE — PLAN OF CARE
Assessment/Intervention/Current Symtoms and Care Coordination    Writer consulted with care team and met with patient. Patient notes not getting good sleep last night and thus states she napped today. Patient is aware of and in agreement with discharge plan to [facility] on Thursday with a plan for 11:00am admit. Writer discussed options for outpatient psychiatry. Patient notes she would be interested in seeing a provider within the [facility]        Discharge Plan or Goal: [facility]       Barriers to Discharge: Admission to [facility], coordination of care        Referral Status:  12/21/21 - Patient was scheduled for a phone assessment with [facility]. On Monday, December 27 at  9:00am 12/27: Accepted for admission Thursday 12/30.     MA application Approved on 12/16  MA Number # [id number]  SNAP Application completed sister checking e mails  [facility]  referral received by [facility]/approved- 1- 2 weeks for case manger t be assigned. [facility] [phone].      [facility] has verified receiving referral. [facility] has indicated the availability at the [place] location may become available first. All three locations are close to public transit  Fax  Writer spoke to admission counselor. Remains a couple weeks out, due to Holidays. 12/27: Writer provided update of admission to another program         Legal Status: Voluntary      Alejandrina Nogueira, [facility], 12/28/2021, 4:07 PM

## 2021-12-28 NOTE — PLAN OF CARE
Patient was observed sleeping throughout the night. No suicidal behavior was seen.Safety checks were completed every 15 minutes, no fall reported, patient denied pain, anxiety, depression and hallucination. No medication due at this time.

## 2021-12-28 NOTE — PROGRESS NOTES
12/28/21 1214   Engagement   Intervention Group   Topic Detail chair yoga, breath work and massage for wellbeing, relaxation, coping, leisure and socializing   Attendance Attended   Patient Response Demonstrated understanding of materials provided;Improved mood in response to group;Positive attitude;Expressed feelings/issues;Was respectful;Expressed positive beliefs about self   Concentrated on Task duration of group   Cognition Goal-directed   Mood/Affect Pleasant;Bright   Social/Behavioral Cooperative;Engaged   Goals addressed in session today Pt ID'd crafts as a coping skill.

## 2021-12-28 NOTE — PROGRESS NOTES
"PSYCHIATRY  PROGRESS NOTE     DATE OF SERVICE   12/28/2021           CHIEF COMPLAINT   \" I am okay.\"       SUBJECTIVE/OBJECTIVE     Patient has been pleasant and cooperative. She denies suicidal or homicidal thoughts and is no longer voicing delusional thinking. She reports some improvement in her lip pain. She would like to switch Celexa to evening due to some sedation. She is agreeable to plan for IRTS placement. Minimal change in mental status in the past 24 hours      Patient plans to go to [facility] later this week         MEDICATIONS   Medications:  Scheduled Meds:    citalopram  10 mg Oral Daily with supper     ziprasidone  40 mg Oral BID w/meals     Continuous Infusions:  PRN Meds:.acetaminophen, alum & mag hydroxide-simethicone, artificial saliva, hydrOXYzine, OLANZapine **OR** OLANZapine, traZODone    Medication adherence issues: MS Med Adherence Y/N: Yes, Hospitalization  Medication side effects: MEDICATION SIDE EFFECTS: no side effects reported  Benefit: Yes / No: Yes       ROS   A comprehensive review of systems was negative.  Lip numbness/discomfort with some improvement    Review of Systems is otherwise negative including HEENT, CV, Respiratory, GI, , Musculoskeletal, Neurologic, Dermatologic, Endocrine, Immunological, Constitutional systems         MENTAL STATUS EXAM   Vitals: /62 (BP Location: Left arm)   Pulse 63   Temp 99.6  F (37.6  C)   Resp 17   Ht 1.651 m (5' 5\")   Wt 59.1 kg (130 lb 6.4 oz)   SpO2 99%   BMI 21.70 kg/m      Appearance:  No apparent distress  Mood: Euthymic  Affect: mood congruent  Suicidal Ideation: PRESENT / ABSENT: absent   Homicidal Ideation: PRESENT / ABSENT: absent   Thought process: Linear and goal-directed  Thought content: No delusions or psychotic symptoms observed  Fund of Knowledge: Average  Attention/Concentration: Normal  Language ability:  Intact  Memory:  Immediate recall intact, Short-term memory intact and Long-term memory intact  Insight: " Improving  Judgement: improving  Orientation: Yes, x4  Psychomotor Behavior: normal or unremarkable    Muscle Strength and Tone: MuscleStrength: Normal  Gait and Station: Normal    Minimal change in mental status in the past 24 hours       LABS   personally reviewed.     No results found for: PHENYTOIN, PHENOBARB, VALPROATE, CBMZ       DIAGNOSIS   Principal Problem:    Acute exacerbation of chronic paranoid schizophrenia (H)    Active Problem List:  Patient Active Problem List   Diagnosis     Psychosis (H)     Acute exacerbation of chronic paranoid schizophrenia (H)          PLAN   1. Ongoing education given regarding diagnostic and treatment options with risks, benefits and alternatives and adequate verbalization of understanding.  2.  Medications:      citalopram  10 mg Oral Daily with supper     ziprasidone  40 mg Oral BID w/meals       3.  Medical team to follow-up as needed  4.   coordinating a safe discharge plan with family, anticipate IRTS      Risk Assessment: [facility] RISK ASSESSMENT: Patient able to contract for safety    Coordination of Care:   Treatment Plan reviewed and physician signed, Care discussed with Care/Treatment Team Members, Chart reviewed and Patient seen    No further change in treatment plan  Patient seen, chart reviewed, case reviewed with  and with nursing.       Re-Certification I certify that the inpatient psychiatric facility services furnished since the previous certification were, and continue to be, medically necessary for, either, treatment which could reasonably be expected to improve the patient s condition or diagnostic study and that the hospital records indicate that the services furnished were, either, intensive treatment services, admission and related services necessary for diagnostic study, or equivalent services.     I certify that the patient continues to need, on a daily basis, active treatment furnished directly by or requiring the  supervision of inpatient psychiatric facility personnel.   I estimate 7 days of hospitalization is necessary for proper treatment of the patient. My plans for post-hospital care for this patient are  IRTS facility      Poncho Nails MD

## 2021-12-28 NOTE — PLAN OF CARE
"Patient admits to \"feeling good\" today and denies depression and anxiety and looks forward to discharge. She participates in group activities and interacts with peers appropriately and remains med compliant with denial of SI/HI and is able to contract for safety   Problem: Behavioral Health Plan of Care  Goal: Plan of Care Review  Outcome: Improving  Flowsheets (Taken 12/28/2021 1055)  Plan of Care Reviewed With: patient  Patient Agreement with Plan of Care: agrees     Problem: Activity and Energy Impairment (Anxiety Signs/Symptoms)  Goal: Optimized Energy Level (Anxiety Signs/Symptoms)  Outcome: No Change  Flowsheets (Taken 12/28/2021 1055)  Mutually Determined Action Steps (Optimized Energy Level): grooms self without prompting     Problem: Cognitive Impairment (Anxiety Signs/Symptoms)  Goal: Optimized Cognitive Function (Anxiety Signs/Symptoms)  Outcome: No Change  Flowsheets (Taken 12/28/2021 1055)  Mutually Determined Action Steps (Optimized Cognitive Function):   participates in one-to-one sessions   contributes to treatment plan     "

## 2021-12-28 NOTE — PROGRESS NOTES
12/28/21 1642   Engagement   Intervention Group   Topic Detail Beading for creativity, leisure, relaxation and socializing   Attendance Attended   Patient Response Demonstrated understanding of materials provided;Was respectful;Improved mood in response to group;Expressed feelings/issues;Prosocial behavior   Concentrated on Task duration of group   Cognition Goal-directed;Attends to detail   Mood/Affect Pleasant;Bright   Social/Behavioral Cooperative;Engaged   Goals addressed in session today Pt pleased with her key chains and was set up with pictures to color for d/c on thursday

## 2021-12-29 ENCOUNTER — HOSPITAL ENCOUNTER (INPATIENT)
Facility: CLINIC | Age: 56
LOS: 1 days | Discharge: PSYCHIATRIC OR SUBSTANCE USE FACILITY | End: 2021-12-30
Attending: PSYCHIATRY & NEUROLOGY | Admitting: PSYCHIATRY & NEUROLOGY
Payer: MEDICAID

## 2021-12-29 VITALS
OXYGEN SATURATION: 99 % | HEIGHT: 65 IN | TEMPERATURE: 97.9 F | RESPIRATION RATE: 16 BRPM | WEIGHT: 126.4 LBS | DIASTOLIC BLOOD PRESSURE: 58 MMHG | HEART RATE: 58 BPM | SYSTOLIC BLOOD PRESSURE: 114 MMHG | BODY MASS INDEX: 21.06 KG/M2

## 2021-12-29 DIAGNOSIS — F31.4: Primary | ICD-10-CM

## 2021-12-29 PROCEDURE — 250N000013 HC RX MED GY IP 250 OP 250 PS 637: Performed by: PSYCHIATRY & NEUROLOGY

## 2021-12-29 PROCEDURE — 128N000001 HC R&B CD/MH ADULT

## 2021-12-29 PROCEDURE — 124N000001 HC R&B MH

## 2021-12-29 PROCEDURE — 99231 SBSQ HOSP IP/OBS SF/LOW 25: CPT | Performed by: PSYCHIATRY & NEUROLOGY

## 2021-12-29 RX ORDER — OLANZAPINE 10 MG/2ML
10 INJECTION, POWDER, FOR SOLUTION INTRAMUSCULAR 3 TIMES DAILY PRN
Status: DISCONTINUED | OUTPATIENT
Start: 2021-12-29 | End: 2021-12-30 | Stop reason: HOSPADM

## 2021-12-29 RX ORDER — OLANZAPINE 10 MG/1
10 TABLET ORAL 3 TIMES DAILY PRN
Status: DISCONTINUED | OUTPATIENT
Start: 2021-12-29 | End: 2021-12-30 | Stop reason: HOSPADM

## 2021-12-29 RX ORDER — CITALOPRAM HYDROBROMIDE 10 MG/1
10 TABLET ORAL
Status: DISCONTINUED | OUTPATIENT
Start: 2021-12-29 | End: 2021-12-30 | Stop reason: HOSPADM

## 2021-12-29 RX ORDER — ZIPRASIDONE HYDROCHLORIDE 40 MG/1
40 CAPSULE ORAL 2 TIMES DAILY WITH MEALS
Status: DISCONTINUED | OUTPATIENT
Start: 2021-12-29 | End: 2021-12-30 | Stop reason: HOSPADM

## 2021-12-29 RX ORDER — HYDROXYZINE HYDROCHLORIDE 25 MG/1
25 TABLET, FILM COATED ORAL EVERY 4 HOURS PRN
Status: DISCONTINUED | OUTPATIENT
Start: 2021-12-29 | End: 2021-12-30 | Stop reason: HOSPADM

## 2021-12-29 RX ORDER — TRAZODONE HYDROCHLORIDE 50 MG/1
50 TABLET, FILM COATED ORAL
Status: DISCONTINUED | OUTPATIENT
Start: 2021-12-29 | End: 2021-12-30 | Stop reason: HOSPADM

## 2021-12-29 RX ORDER — SALIVA STIMULANT COMB. NO.3
2 SPRAY, NON-AEROSOL (ML) MUCOUS MEMBRANE 4 TIMES DAILY PRN
Status: DISCONTINUED | OUTPATIENT
Start: 2021-12-29 | End: 2021-12-29 | Stop reason: CLARIF

## 2021-12-29 RX ORDER — MAGNESIUM HYDROXIDE/ALUMINUM HYDROXICE/SIMETHICONE 120; 1200; 1200 MG/30ML; MG/30ML; MG/30ML
30 SUSPENSION ORAL EVERY 4 HOURS PRN
Status: DISCONTINUED | OUTPATIENT
Start: 2021-12-29 | End: 2021-12-30 | Stop reason: HOSPADM

## 2021-12-29 RX ORDER — ACETAMINOPHEN 325 MG/1
650 TABLET ORAL EVERY 4 HOURS PRN
Status: DISCONTINUED | OUTPATIENT
Start: 2021-12-29 | End: 2021-12-30 | Stop reason: HOSPADM

## 2021-12-29 RX ADMIN — CITALOPRAM HYDROBROMIDE 10 MG: 10 TABLET ORAL at 17:19

## 2021-12-29 RX ADMIN — ZIPRASIDONE HCL 40 MG: 40 CAPSULE ORAL at 17:19

## 2021-12-29 RX ADMIN — ZIPRASIDONE HCL 40 MG: 40 CAPSULE ORAL at 08:29

## 2021-12-29 ASSESSMENT — ACTIVITIES OF DAILY LIVING (ADL)
HYGIENE/GROOMING: SHOWER;INDEPENDENT
ORAL_HYGIENE: PROMPTS
ORAL_HYGIENE: INDEPENDENT
DRESS: STREET CLOTHES;SCRUBS (BEHAVIORAL HEALTH);INDEPENDENT
DRESS: INDEPENDENT
LAUNDRY: WITH SUPERVISION
HYGIENE/GROOMING: INDEPENDENT

## 2021-12-29 NOTE — PLAN OF CARE
Problem: Suicidal Behavior  Goal: Suicidal Behavior is Absent or Managed  Outcome: Improving   Patient was calm and isolative in her room and only came out for dinner and snacks. While out for meals she socialized and  engaged with peers but did not attend groups. Vital signs were within normal limit. Denied SI/HI, Hallucinations/delusions.  She reported mild anxiety and depression but did not want any intervention. Patient is scheduled for discharge tomorrow  per 's note. This was discussed with patient and she appeared very excited when she talked about it. Patient is compliant with medications and contracted for safety.Gracy Rich, RN

## 2021-12-29 NOTE — PLAN OF CARE
"  Problem: Somatic Disturbance (Anxiety Signs/Symptoms)  Goal: Improved Somatic Symptoms (Anxiety Signs/Symptoms)  Outcome: Improving     Problem: Activity and Energy Impairment (Depressive Signs/Symptoms)  Goal: Optimized Energy Level (Depressive Signs/Symptoms)  Outcome: Improving   Patient was isolative in her room at the beginning of the shift. Patient appears flat and guarded. Patient minimizes her signs and symptoms of psych. Patient denies pain, anxiety, depression and other psych symptoms. Contracts for safety.Patient interfered with staff while attempting to assist another peer. Per patient, \"the peer's needs were not met\". Writer attempted to educate the patient with no evidence of learning.Patient stayed in the milieu after dinner for a while. Patient was observed watching TV with peers.  "

## 2021-12-29 NOTE — PLAN OF CARE
"Assessment/Intervention, Care Coordination and Current Symptoms:   CTC met with patient while she was still on 2800. It was reported from the unit RN that the patient will transfer to PCU 5500. CTC met with patient a second time after her transfer to 5500ab. Patient was calm, pleasant, and engaged. She reported to being grateful \"the system did not let me down.\" After discussing tomorrow's discharge and the timing of the discharge with patient, she reported that after she admits to [facility] tomorrow, she will work with the treatment team and SW at [facility] to schedule her PCP follow-up, physical exam, mammogram, PAP, and psychiatry appointment through [facility]. Patient was pleased she was allowed to bring the books she was reading while on 2800 to 5500.       Discharge Plan or Goal:  [facility] [employer], [facility] [[email or web address]     Barriers to Discharge:  None     Referral Status:    12/21/21 - Patient was scheduled for a phone assessment with Applico. On Monday, December 27 at  9:00am 12/27: Accepted for admission Thursday 12/30.     MA application Approved on 12/16  MA Number # [id number]  SNAP Application completed sister checking e mails  [facility]  referral received by [facility]/approved- 1[facility] [phone].      [facility] has verified receiving referral. [facility] has indicated the availability at the [place] location may become available first. All three locations are close to public transit  Fax  Writer spoke to admission counselor. Remains a couple weeks out, due to Holidays. 12/27: Writer provided update of admission to another program    Legal Status:  Voluntary      Al Zhong MA, [facility], 12/29/2021, 1:54 PM      "

## 2021-12-29 NOTE — PLAN OF CARE
Problem: Coping with Symptoms  Goal: Practice Coping Skills  Description: Pt will practice using 4 coping strategies to manage stress and reduce symptoms this review period.   Outcome: Change based on patient need/priority   Occupational Therapy Mental Health    Discontinue OT mental health goals due to patient transfer to different mental health unit. Re-instate OT mental health goals when pt arrives on new mental health unit.

## 2021-12-29 NOTE — PLAN OF CARE
Problem: Behavioral Health Plan of Care  Goal: Plan of Care Review  Outcome: Improving  Flowsheets (Taken 12/29/2021 0904)  Plan of Care Reviewed With: patient  Progress: improving  Patient Agreement with Plan of Care: agrees  Note: Patient will be discharged to [facility] facility

## 2021-12-29 NOTE — PHARMACY-ADMISSION MEDICATION HISTORY
Patient transferring units to 5500 after 2800 was closed. Admission medication history completed in [facility] Emergency Department.  Please refer to pharmacist note from 12/9/2021 for details. Thanks.      Saul Zaidi, YeimiD

## 2021-12-29 NOTE — PLAN OF CARE
Patient as calm and non-aggressive, she slept most part of the night shift without any distress. Safety checks were completed every 15 minutes. No pain was reported and patient denied anxiety, depression and hallucination. Cheeking could not be assessed as no  scheduled medication was due at this time.

## 2021-12-29 NOTE — DISCHARGE SUMMARY
Patient transferred to 5500 from 2800 due to conversion of 2800 to covid unit. See initial History and Physical for more information and full discharge summary to follow upon discharge from hospital

## 2021-12-29 NOTE — PROGRESS NOTES
Cooperative with transfer from 2800, Katja (Beatris) ate lunch and snack and has been fairly isolative.  She got Support Your Healing essential oil on a cotton ball this afternoon.  She declined group this afternoon saying that she wanted to read her book as it belonged to the unit and couldn't take it with her.    Per SW note, she will discharge tomorrow.

## 2021-12-29 NOTE — DISCHARGE INSTRUCTIONS
Behavioral Discharge Planning and Instructions    Summary: You were admitted on 12/9/2021  due to {Mental Health 1:590640}.  You were treated by *** and discharged on ***/***/*** from *** to {[facility] D/C Locations:576598}    Main Diagnosis: ***    Health Care Follow-up:   Appointment Date/Time: ***   Psychiatrist/Primary Care Giver: ***   Address: ***   Phone Number: ***    Appointment Date/Time: ***   Therapist: ***   Address: ***   Phone Number: ***    Appointment Date/Time: ***   Support Group: ***   Address: ***   Phone Number: ***    Appointment Date/Time: ***   Other: ***   Address: ***   Phone Number: ***    If no appointments scheduled, explain ***    Attend all scheduled appointments with your outpatient providers. Call at least 24 hours in advance if you need to reschedule an appointment to ensure continued access to your outpatient providers.     Major Treatments, Procedures and Findings:  You were provided with: {Major Treatments:710221}    Symptoms to Report: {symptoms:042555}    Early warning signs can include: { Warning Signs:032884}    Safety and Wellness:  {Age Group Safety Wellness:388519}    Resources:   { RESOURCES MB:247225}    General Medication Instructions:   See your medication sheet(s) for instructions.   Take all medicines as directed.  Make no changes unless your doctor suggests them.   Go to all your doctor visits.  Be sure to have all your required lab tests. This way, your medicines can be refilled on time.  Do not use any drugs not prescribed by your doctor.  Avoid alcohol.    Advance Directives:   Scanned document on file with [facility]? No scanned doc  Is document scanned? Pt states no documents  Honoring Choices Your Rights Handout: Informed and given  Was more information offered? Pt declined    The Treatment team has appreciated the opportunity to work with you. If you have any questions or concerns about your recent admission, you can contact the unit which can receive your call  24 hours a day, 7 days a week. They will be able to get in touch with a Provider if needed. The unit number is *** .

## 2021-12-29 NOTE — PLAN OF CARE
Problem: Activity and Energy Impairment (Anxiety Signs/Symptoms)  Goal: Optimized Energy Level (Anxiety Signs/Symptoms)  Outcome: Change based on patient need/priority  Intervention: Optimize Energy Level  Recent Flowsheet Documentation  Taken 12/29/2021 0845 by Diane Howe RN  Diversional Activity:    puzzles    television     Problem: Cognitive Impairment (Anxiety Signs/Symptoms)  Goal: Optimized Cognitive Function (Anxiety Signs/Symptoms)  Outcome: Change based on patient need/priority     Problem: Cognitive Impairment (Anxiety Signs/Symptoms)  Goal: Optimized Cognitive Function (Anxiety Signs/Symptoms)  Outcome: Change based on patient need/priority     Problem: Mood Impairment (Anxiety Signs/Symptoms)  Goal: Improved Mood Symptoms (Anxiety Signs/Symptoms)  Outcome: Change based on patient need/priority     Problem: Mood Impairment (Anxiety Signs/Symptoms)  Goal: Improved Mood Symptoms (Anxiety Signs/Symptoms)  Outcome: Change based on patient need/priority     Problem: Sleep Disturbance (Anxiety Signs/Symptoms)  Goal: Improved Sleep (Anxiety Signs/Symptoms)  Outcome: Change based on patient need/priority     Problem: Somatic Disturbance (Anxiety Signs/Symptoms)  Goal: Improved Somatic Symptoms (Anxiety Signs/Symptoms)  Outcome: Change based on patient need/priority     Problem: Activity and Energy Impairment (Depressive Signs/Symptoms)  Goal: Optimized Energy Level (Depressive Signs/Symptoms)  Outcome: Change based on patient need/priority  Intervention: Optimize Energy Level  Recent Flowsheet Documentation  Taken 12/29/2021 0845 by Diane Howe RN  Diversional Activity:    puzzles    television     Problem: Decreased Participation and Engagement (Depressive Signs/Symptoms)  Goal: Increased Participation and Engagement (Depressive Signs/Symptoms)  Outcome: Change based on patient need/priority  Intervention: Facilitate Participation and Engagement  Recent Flowsheet Documentation  Taken 12/29/2021  0845 by Diane Howe RN  Diversional Activity:    [employer]     Problem: Feelings of Worthlessness, Hopelessness or Excessive Guilt (Depressive Signs/Symptoms)  Goal: Enhanced Self-Esteem and Confidence (Depressive Signs/Symptoms)  Outcome: Change based on patient need/priority     Problem: Mood Impairment (Depressive Signs/Symptoms)  Goal: Improved Mood Symptoms (Depressive Signs/Symptoms)  Outcome: Change based on patient need/priority  Intervention: Promote Mood Improvement  Recent Flowsheet Documentation  Taken 12/29/2021 0845 by Diane Howe RN  Diversional Activity:    [employer]    television     Problem: Sleep Disturbance (Depressive Signs/Symptoms)  Goal: Improved Sleep (Depressive Signs/Symptoms)  Outcome: Change based on patient need/priority     Problem: Social, Occupational or Functional Impairment (Depressive Signs/Symptoms)  Goal: Enhanced Social, Occupational or Functional Skills (Depressive Signs/Symptoms)  Outcome: Change based on patient need/priority     Problem: Behavioral Health Plan of Care  Goal: Plan of Care Review  12/29/2021 0926 by Diane Howe RN  Outcome: Change based on patient need/priority  12/29/2021 0904 by Diane Howe RN  Outcome: Improving  Flowsheets (Taken 12/29/2021 0904)  Plan of Care Reviewed With: patient  Progress: improving  Patient Agreement with Plan of Care: agrees  Note: Patient will be discharged to [facility] facility  Goal: Patient-Specific Goal (Individualization)  Outcome: Change based on patient need/priority  Goal: Adheres to Safety Considerations for Self and Others  Outcome: Change based on patient need/priority  Intervention: Develop and Maintain Individualized Safety Plan  Recent Flowsheet Documentation  Taken 12/29/2021 0845 by Diane Howe RN  Safety Measures:    environmental rounds completed    safety rounds completed  Goal: Absence of New-Onset Illness or Injury  Outcome: Change based on patient  need/priority  Intervention: Identify and Manage Fall Risk  Recent Flowsheet Documentation  Taken 12/29/2021 0845 by Diane Howe RN  Safety Measures:    environmental rounds completed    safety rounds completed  Goal: Optimized Coping Skills in Response to Life Stressors  Outcome: Change based on patient need/priority  Goal: Develops/Participates in Therapeutic Dequincy to Support Successful Transition  Outcome: Change based on patient need/priority     Problem: Sleep Disturbance  Goal: Adequate Sleep/Rest  Outcome: Change based on patient need/priority   Patient to be discharged today, due to unit needs.  Patient enthusiastic for discharge to [facility] facility tomorrow.  Patient was  isolative to room, intermittently out in the milieu to seek out staff.

## 2021-12-29 NOTE — PROGRESS NOTES
"PSYCHIATRY  PROGRESS NOTE     DATE OF SERVICE   12/29/2021           CHIEF COMPLAINT   \" I am okay.\"       SUBJECTIVE/OBJECTIVE     Patient has been pleasant and cooperative. She denies suicidal or homicidal thoughts and is no longer voicing delusional thinking. She reports some improvement in her lip pain. . She is agreeable to plan for IRTS placement. She has had minimal change in the past 24 hours      Patient plans to go to [facility] tomorrow         MEDICATIONS   Medications:  Scheduled Meds:    citalopram  10 mg Oral Daily with supper     ziprasidone  40 mg Oral BID w/meals     Continuous Infusions:  PRN Meds:.acetaminophen, alum & mag hydroxide-simethicone, artificial saliva, hydrOXYzine, OLANZapine **OR** OLANZapine, traZODone    Medication adherence issues: MS Med Adherence Y/N: Yes, Hospitalization  Medication side effects: MEDICATION SIDE EFFECTS: no side effects reported  Benefit: Yes / No: Yes       ROS   A comprehensive review of systems was negative.  Lip numbness/discomfort with some improvement    Review of Systems is otherwise negative including HEENT, CV, Respiratory, GI, , Musculoskeletal, Neurologic, Dermatologic, Endocrine, Immunological, Constitutional systems         MENTAL STATUS EXAM   Vitals: /58 (BP Location: Left arm)   Pulse 58   Temp 97.9  F (36.6  C) (Oral)   Resp 16   Ht 1.651 m (5' 5\")   Wt 57.3 kg (126 lb 6.4 oz)   SpO2 99%   BMI 21.03 kg/m      Appearance:  No apparent distress  Mood: Euthymic  Affect: mood congruent  Suicidal Ideation: PRESENT / ABSENT: absent   Homicidal Ideation: PRESENT / ABSENT: absent   Thought process: Linear and goal-directed  Thought content: No delusions or psychotic symptoms observed  Fund of Knowledge: Average  Attention/Concentration: Normal  Language ability:  Intact  Memory:  Immediate recall intact, Short-term memory intact and Long-term memory intact  Insight: Improving  Judgement: improving  Orientation: Yes, x4  Psychomotor " Behavior: normal or unremarkable    Muscle Strength and Tone: MuscleStrength: Normal  Gait and Station: Normal    Minimal change in mental status in the past 24 hours       LABS   personally reviewed.     No results found for: PHENYTOIN, PHENOBARB, VALPROATE, CBMZ       DIAGNOSIS   Principal Problem:    Acute exacerbation of chronic paranoid schizophrenia (H)    Active Problem List:  Patient Active Problem List   Diagnosis     Psychosis (H)     Acute exacerbation of chronic paranoid schizophrenia (H)          PLAN   1. Ongoing education given regarding diagnostic and treatment options with risks, benefits and alternatives and adequate verbalization of understanding.  2.  Medications:      citalopram  10 mg Oral Daily with supper     ziprasidone  40 mg Oral BID w/meals       3.  Medical team to follow-up as needed  4.   coordinating a safe discharge plan with family, anticipate IRTS      Risk Assessment: [facility] RISK ASSESSMENT: Patient able to contract for safety    Coordination of Care:   Treatment Plan reviewed and physician signed, Care discussed with Care/Treatment Team Members, Chart reviewed and Patient seen    No further change in treatment plan  Patient seen, chart reviewed, case reviewed with  and with nursing.       Re-Certification I certify that the inpatient psychiatric facility services furnished since the previous certification were, and continue to be, medically necessary for, either, treatment which could reasonably be expected to improve the patient s condition or diagnostic study and that the hospital records indicate that the services furnished were, either, intensive treatment services, admission and related services necessary for diagnostic study, or equivalent services.     I certify that the patient continues to need, on a daily basis, active treatment furnished directly by or requiring the supervision of inpatient psychiatric facility personnel.   I estimate 7  days of hospitalization is necessary for proper treatment of the patient. My plans for post-hospital care for this patient are  IRTS facility      Poncho Nails MD

## 2021-12-29 NOTE — PROGRESS NOTES
12/29/21 1511   Engagement   Intervention Group   Topic Detail OT: Wellness (Qigong) to promote physical wellnes, relaxation, coping strategies and pain management for management of mental health symptoms   Attendance Did not attend

## 2021-12-30 VITALS — OXYGEN SATURATION: 100 % | TEMPERATURE: 98.8 F | RESPIRATION RATE: 17 BRPM

## 2021-12-30 PROBLEM — F31.4: Status: ACTIVE | Noted: 2021-12-30

## 2021-12-30 PROCEDURE — 250N000013 HC RX MED GY IP 250 OP 250 PS 637: Performed by: PSYCHIATRY & NEUROLOGY

## 2021-12-30 PROCEDURE — 99239 HOSP IP/OBS DSCHRG MGMT >30: CPT | Performed by: PSYCHIATRY & NEUROLOGY

## 2021-12-30 RX ORDER — MULTIVITAMIN,THERAPEUTIC
1 TABLET ORAL DAILY
Qty: 30 TABLET | Refills: 1 | Status: SHIPPED | OUTPATIENT
Start: 2021-12-30

## 2021-12-30 RX ORDER — ZIPRASIDONE HYDROCHLORIDE 40 MG/1
40 CAPSULE ORAL 2 TIMES DAILY WITH MEALS
Qty: 60 CAPSULE | Refills: 1 | Status: SHIPPED | OUTPATIENT
Start: 2021-12-30

## 2021-12-30 RX ORDER — CITALOPRAM HYDROBROMIDE 10 MG/1
10 TABLET ORAL
Qty: 30 TABLET | Refills: 1 | Status: SHIPPED | OUTPATIENT
Start: 2021-12-30

## 2021-12-30 RX ADMIN — ZIPRASIDONE HCL 40 MG: 40 CAPSULE ORAL at 07:57

## 2021-12-30 ASSESSMENT — ACTIVITIES OF DAILY LIVING (ADL)
HYGIENE/GROOMING: INDEPENDENT
DRESS: INDEPENDENT
ORAL_HYGIENE: PROMPTS
LAUNDRY: WITH SUPERVISION

## 2021-12-30 NOTE — PLAN OF CARE
BEHAVIORAL TEAM DISCUSSION    Participants: RN: BRAYAN CTC: MANUELITO Oliver MD, OT: Pharmacy: AKI Psychology: none  Progress: discharge today to [facility] IRTS  Anticipated length of stay: discharge today  Continued Stay Criteria/Rationale: None  Medical/Physical: None reported  Precautions: None reported  Behavioral Orders   Procedures    Cheeking Precautions (behavioral units)    Code 1 - Restrict to Unit    Routine Programming     As clinically indicated    Status 15     Every 15 minutes.    Suicide precautions     Patients on Suicide Precautions should have a Combination Diet ordered that includes a Diet selection(s) AND a Behavioral Tray selection for Safe Tray - with utensils, or Safe Tray - NO utensils       Plan: [facility] I[facility]  Rationale for change in precautions or plan: N/A

## 2021-12-30 NOTE — DISCHARGE INSTRUCTIONS
Behavioral Discharge Planning and Instructions    Summary: You were admitted on 12/29/2021  due to Psychotic Symptomology.  You were treated by Poncho Nails MD and discharged on 12/30/21 from [facility] to [facility]    Main Diagnosis: Psychosis    Health Care Follow-up:   Appointment Date/Time: 12/30/21 at 11:00am  Other: [facility]   Address: [address] [place] [address]   Phone Number: [phone]      If no appointments scheduled, explain: Per patient request, she would like to schedule her own appointments through [facility]. Follow-up with treatment team and  at [facility].     Attend all scheduled appointments with your outpatient providers. Call at least 24 hours in advance if you need to reschedule an appointment to ensure continued access to your outpatient providers.     Major Treatments, Procedures and Findings:  You were provided with: a psychiatric assessment, assessed for medical stability, medication evaluation and/or management, group therapy, individual therapy and milieu management    Symptoms to Report: increased confusion, losing more sleep or mood getting worse    Early warning signs can include: sleep disturbances increased unusual thinking, such as paranoia or hearing voices    Safety and Wellness:  Take all medicines as directed.  Make no changes unless your doctor suggests them.      Follow treatment recommendations.  Refrain from alcohol and non-prescribed drugs.  If there is a concern for safety, call [phone].    Resources:   Crisis Intervention: 709.432.6363 or 354-541-4504 (TTY: 270.740.6133).  Call anytime for help.  National [place] on Mental Illness (www.[place].angel.org): 461.836.7571 or 199-911-4778.  Mental Health Consumer/Survivor Network of [place] (www.mhcsn.net): 432.297.4546 or 048-932-5056  Mental Health Association of [place] (www.mentalhealth.org): 298.370.9263 or  "827.913.7886  [facility] Crisis Response - Adult 845 683-1093  Crisis text line: Text \"[place]\" to 083219. Free, confidential, 24/7.  Crisis Intervention: 132.158.5968 or 331-830-2336. Call anytime for help.     General Medication Instructions:   See your medication sheet(s) for instructions.   Take all medicines as directed.  Make no changes unless your doctor suggests them.   Go to all your doctor visits.  Be sure to have all your required lab tests. This way, your medicines can be refilled on time.  Do not use any drugs not prescribed by your doctor.  Avoid alcohol.    Advance Directives:   Scanned document on file with [employer]?    Is document scanned?    Honoring Choices Your Rights Handout:    Was more information offered?      The Treatment team has appreciated the opportunity to work with you. If you have any questions or concerns about your recent admission, you can contact the unit which can receive your call 24 hours a day, 7 days a week. They will be able to get in touch with a Provider if needed. The unit number is [phone] .  "
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
## 2021-12-30 NOTE — PLAN OF CARE
Pt was visible in the milieu, engaged and participated in group activities with peers prior to discharged. Alert/oriented x 4, denied any discomfort, SI, HI, hallucination, anxiety, depression and contracted for safety. Discharged follow up with PCP, therapist, medications, community resources reviewed with patient and verbalized understanding. Pt was transported via a taxi carb to [facility] at about 1025 for continuity of care.

## 2021-12-30 NOTE — H&P
Patient transferred to 5500 from 2800 due to conversion of 2800 to covid unit. See initial History and Physical for more information and full discharge summary to follow upon discharge from hospital. See H and P done by Dr. Bocanegra on admission to 2800 from 12/10

## 2021-12-30 NOTE — PROGRESS NOTES
Discharging today to [facility] via cab. Discharge paperwork reviewed with patient and copy provided. Discharge medications were filled by the [facility] pharmacy to be given in hand upon discharge and reviewed with patient. All belongings were returned to her upon discharge. At the time of discharge she was alert and orientated, calm and cooperative. She denies feeling SI, SIB, or HI. Katarzyna Berg RN

## 2021-12-30 NOTE — PLAN OF CARE
Problem: Suicidal Behavior  Goal: Suicidal Behavior is Absent or Managed  Outcome: Improving     Pt continues to be on SI and cheeking precautions.  No prn given.  She has slept through the night without complain of pain or distress. Will monitor and follow plan of care.

## 2021-12-30 NOTE — PLAN OF CARE
Discharge plan or goal: [facility].     Today's Plan:  CTC met with patient to review her discharge plan. Patient remains in agreement with the plan to admit to [facility]. When writer approached patient, she was in her room sitting up in bed reading. Patient was calm, pleasant, and cooperative. She denies new or worsening psych symptoms. She was groomed and dressed in her own clothes. Patient continues to request that she work with the social workers at [facility] to schedule continuum of care appointments for her PCP and psychiatry through [facility]. CTC spoke with the staff at [facility] and they agreed to follow-up with the patient regarding scheduling her appointments.     Patient transportation from [facility] provided by taxi arranged by the [facility].     Pt has the following outpatient appointment(s) scheduled:  Appointment Date/Time: 12/30/21 at 11:00am  Other: [facility]   Address: [address] [place] [address]   Phone Number: [phone]       If no appointments scheduled, explain: Per patient request, she would like to schedule her own appointments through [facility]. Patient encouraged to follow-up with treatment team and  at [facility] in order to schedule these appointments.  Writer consulted with the staff at [facility] and they agreed to follow-up with patient and assist in scheduling continuum of care appointments.     Pt has the following professional community support(s): Therapist and counselors. Patient requested to schedule continuum of care appointments on her own with providers in [facility] network.     Legal Status: Voluntary     Diagnosis/Procedure:   Patient Active Problem List   Diagnosis     Psychosis (H)     Acute exacerbation of chronic paranoid schizophrenia (H)     Bipolar disord, crnt epsd depress, sev, w/o psych  features (H)     Care Rounds Attendance:   [facility]   RN   Charge RN   OT/TR  MD/CNP    Al Zhong MA, [facility], 12/30/2021, 9:55 AM

## 2021-12-30 NOTE — PROGRESS NOTES
Occupational Therapy Discharge Note    Patient Name:  Beatris Cuellar    D: Refer to daily doc flowsheets for details of progress toward goals.  A: Improvement seen in mood, future orientation and motivation.   P: Patient discharging to [facility]. Discontinue OT Care Plan goals and interventions.    Date: 12/30/2021  Time: 3:24 PM